# Patient Record
Sex: FEMALE | Race: WHITE | Employment: FULL TIME | URBAN - METROPOLITAN AREA
[De-identification: names, ages, dates, MRNs, and addresses within clinical notes are randomized per-mention and may not be internally consistent; named-entity substitution may affect disease eponyms.]

---

## 2018-01-02 ENCOUNTER — OFFICE VISIT (OUTPATIENT)
Dept: OTOLARYNGOLOGY | Facility: CLINIC | Age: 48
End: 2018-01-02

## 2018-01-02 VITALS
HEIGHT: 63.5 IN | DIASTOLIC BLOOD PRESSURE: 80 MMHG | SYSTOLIC BLOOD PRESSURE: 123 MMHG | WEIGHT: 115 LBS | TEMPERATURE: 99 F | BODY MASS INDEX: 20.12 KG/M2

## 2018-01-02 DIAGNOSIS — B37.0 ORAL THRUSH: Primary | ICD-10-CM

## 2018-01-02 PROCEDURE — 99212 OFFICE O/P EST SF 10 MIN: CPT | Performed by: OTOLARYNGOLOGY

## 2018-01-02 PROCEDURE — 99203 OFFICE O/P NEW LOW 30 MIN: CPT | Performed by: OTOLARYNGOLOGY

## 2018-01-02 RX ORDER — FLUCONAZOLE 200 MG/1
TABLET ORAL
Refills: 0 | COMMUNITY
Start: 2017-12-30 | End: 2019-06-11

## 2018-01-02 RX ORDER — BUDESONIDE AND FORMOTEROL FUMARATE DIHYDRATE 160; 4.5 UG/1; UG/1
AEROSOL RESPIRATORY (INHALATION)
Refills: 2 | COMMUNITY
Start: 2017-12-07 | End: 2019-06-11

## 2018-01-02 NOTE — PROGRESS NOTES
Jordyn Tripp is a 41year old female. Patient presents with:  Sore Throat: sore throat for 2 weeks,  Ear Wax: both ears      HISTORY OF PRESENT ILLNESS  He presents with a recent history of sore throat which started about 2 weeks ago.   Diagnosed with Normal, Left: Normal. Pupil - Right: Normal, Left: Normal. Fundus - Right: Normal, Left: Normal.   Neurological Normal Memory - Normal. Cranial nerves - Cranial nerves II through XII grossly intact.    Head/Face Normal Facial features - Normal. Eyebrows - N

## 2018-04-26 ENCOUNTER — OFFICE VISIT (OUTPATIENT)
Dept: OTOLARYNGOLOGY | Facility: CLINIC | Age: 48
End: 2018-04-26

## 2018-04-26 VITALS
HEIGHT: 63.5 IN | TEMPERATURE: 98 F | SYSTOLIC BLOOD PRESSURE: 122 MMHG | DIASTOLIC BLOOD PRESSURE: 80 MMHG | BODY MASS INDEX: 19.6 KG/M2 | WEIGHT: 112 LBS

## 2018-04-26 DIAGNOSIS — B37.0 ORAL THRUSH: Primary | ICD-10-CM

## 2018-04-26 PROCEDURE — 99214 OFFICE O/P EST MOD 30 MIN: CPT | Performed by: OTOLARYNGOLOGY

## 2018-04-26 PROCEDURE — 99212 OFFICE O/P EST SF 10 MIN: CPT | Performed by: OTOLARYNGOLOGY

## 2018-04-26 RX ORDER — FLUCONAZOLE 100 MG/1
100 TABLET ORAL DAILY
Qty: 4 TABLET | Refills: 0 | Status: SHIPPED | OUTPATIENT
Start: 2018-04-26 | End: 2018-04-30

## 2018-04-26 RX ORDER — FLUCONAZOLE 100 MG/1
TABLET ORAL
COMMUNITY
Start: 2018-04-20 | End: 2018-04-26

## 2018-04-26 RX ORDER — AZELASTINE HCL 205.5 UG/1
SPRAY NASAL
COMMUNITY
Start: 2014-12-23 | End: 2019-06-11

## 2018-04-26 RX ORDER — AZELASTINE HYDROCHLORIDE 0.5 MG/ML
SOLUTION/ DROPS OPHTHALMIC
COMMUNITY
Start: 2015-03-31

## 2018-04-26 RX ORDER — BUDESONIDE AND FORMOTEROL FUMARATE DIHYDRATE 160; 4.5 UG/1; UG/1
AEROSOL RESPIRATORY (INHALATION)
COMMUNITY
Start: 2013-07-30

## 2018-04-26 RX ORDER — AZELASTINE HCL 205.5 UG/1
SPRAY NASAL
COMMUNITY
Start: 2018-04-12 | End: 2019-06-11

## 2018-04-26 RX ORDER — ALBUTEROL SULFATE 90 UG/1
AEROSOL, METERED RESPIRATORY (INHALATION)
COMMUNITY
Start: 2013-07-30

## 2018-04-26 RX ORDER — CEVIMELINE HYDROCHLORIDE 30 MG/1
30 CAPSULE ORAL 3 TIMES DAILY
Qty: 90 CAPSULE | Refills: 3 | Status: SHIPPED | OUTPATIENT
Start: 2018-04-26

## 2018-04-26 RX ORDER — INHALER, ASSIST DEVICES
SPACER (EA) MISCELLANEOUS
Refills: 0 | COMMUNITY
Start: 2018-04-06

## 2018-04-26 RX ORDER — AZELASTINE HYDROCHLORIDE 0.5 MG/ML
SOLUTION/ DROPS OPHTHALMIC
COMMUNITY
Start: 2018-04-10 | End: 2019-06-11

## 2018-04-26 NOTE — PROGRESS NOTES
La Parisi is a 41year old female. Patient presents with:   Tonsil Problem: white spots at her tonsil  Throat Problem: sore throat for a week      HISTORY OF PRESENT ILLNESS  She presents with a recent history of sore throat which started about 2 w Constitutional Normal Overall appearance - Normal.   Psychiatric Normal Orientation - Oriented to time, place, person & situation. Appropriate mood and affect.    Neck Exam Normal Inspection - Normal. Palpation - Normal. Parotid gland - Normal. Thyroid gl Rfl:   •  Azelastine HCl 0.15 % Nasal Solution, , Disp: , Rfl:   •  Azelastine HCl 0.05 % Ophthalmic Solution, , Disp: , Rfl:   •  Spacer/Aero-Holding Chambers (VALVED HOLDING CHAMBER) Does not apply Device, U PRN, Disp: , Rfl: 0  •  nystatin [id number] UNIT/M

## 2019-02-18 ENCOUNTER — OFFICE VISIT (OUTPATIENT)
Dept: DERMATOLOGY CLINIC | Facility: CLINIC | Age: 49
End: 2019-02-18
Payer: COMMERCIAL

## 2019-02-18 DIAGNOSIS — D23.30 BENIGN NEOPLASM OF SKIN OF FACE: ICD-10-CM

## 2019-02-18 DIAGNOSIS — D23.60 BENIGN NEOPLASM OF SKIN OF UPPER LIMB, INCLUDING SHOULDER, UNSPECIFIED LATERALITY: ICD-10-CM

## 2019-02-18 DIAGNOSIS — D23.5 BENIGN NEOPLASM OF SKIN OF TRUNK, EXCEPT SCROTUM: ICD-10-CM

## 2019-02-18 DIAGNOSIS — L81.4 LENTIGO: Primary | ICD-10-CM

## 2019-02-18 DIAGNOSIS — D23.4 BENIGN NEOPLASM OF SCALP AND SKIN OF NECK: ICD-10-CM

## 2019-02-18 PROCEDURE — 99212 OFFICE O/P EST SF 10 MIN: CPT | Performed by: DERMATOLOGY

## 2019-02-18 PROCEDURE — 99203 OFFICE O/P NEW LOW 30 MIN: CPT | Performed by: DERMATOLOGY

## 2019-02-18 RX ORDER — FLUCONAZOLE 100 MG/1
100 TABLET ORAL
COMMUNITY
Start: 2018-04-20 | End: 2019-06-11

## 2019-02-18 RX ORDER — DICLOFENAC SODIUM 75 MG/1
75 TABLET, DELAYED RELEASE ORAL
COMMUNITY
Start: 2016-02-08

## 2019-02-18 RX ORDER — OMALIZUMAB 202.5 MG/1.4ML
INJECTION, SOLUTION SUBCUTANEOUS
COMMUNITY
Start: 2019-02-12

## 2019-02-18 RX ORDER — PREDNISONE 10 MG/1
TABLET ORAL
COMMUNITY
Start: 2018-04-02 | End: 2019-06-11 | Stop reason: ALTCHOICE

## 2019-02-18 RX ORDER — MONTELUKAST SODIUM 10 MG/1
TABLET ORAL
COMMUNITY
Start: 2018-10-20

## 2019-02-18 RX ORDER — AZELASTINE HCL 205.5 UG/1
2 SPRAY NASAL
COMMUNITY
Start: 2014-12-23

## 2019-02-18 RX ORDER — AZELASTINE 1 MG/ML
1 SPRAY, METERED NASAL
COMMUNITY
End: 2021-11-04 | Stop reason: ALTCHOICE

## 2019-02-18 RX ORDER — OXYMETAZOLINE HCL 0.05 G/100ML
1 SPRAY NASAL
COMMUNITY

## 2019-02-18 RX ORDER — LEVOCETIRIZINE DIHYDROCHLORIDE 5 MG/1
5 TABLET, FILM COATED ORAL
COMMUNITY

## 2019-02-18 RX ORDER — PIMECROLIMUS 10 MG/G
1 CREAM TOPICAL 2 TIMES DAILY
Qty: 60 G | Refills: 6 | Status: SHIPPED | OUTPATIENT
Start: 2019-02-18

## 2019-02-18 RX ORDER — BUDESONIDE AND FORMOTEROL FUMARATE DIHYDRATE 160; 4.5 UG/1; UG/1
2 AEROSOL RESPIRATORY (INHALATION)
COMMUNITY
End: 2019-06-11

## 2019-02-18 RX ORDER — FLUCONAZOLE 100 MG/1
100 TABLET ORAL DAILY
Qty: 10 TABLET | Refills: 3 | Status: SHIPPED | OUTPATIENT
Start: 2019-02-18

## 2019-02-18 RX ORDER — BENZONATATE 100 MG/1
100 CAPSULE ORAL
COMMUNITY
Start: 2018-04-06

## 2019-02-18 RX ORDER — ALBUTEROL SULFATE 90 UG/1
1-2 AEROSOL, METERED RESPIRATORY (INHALATION)
COMMUNITY

## 2019-03-03 NOTE — PROGRESS NOTES
Dario Echevarria is a 57year old female. HPI:     CC:  Patient presents with:  Derm Problem: new pt. pt presenting today with concerns with hair loss. grandmother has HX of SCC. no personal HX. Allergies:  Latex    HISTORY:    History reviewed.  Corby Mata None Entered Disp:  Rfl:    Azelastine HCl 0.15 % Nasal Solution None Entered Disp:  Rfl:    Beclomethasone Dipropionate (QNASL) 80 MCG/ACT Nasal Aero Soln 2 puffs daily Disp:  Rfl:    Tiotropium Bromide Monohydrate (SPIRIVA HANDIHALER) 18 MCG Inhalation C Sexual activity: Not on file    Lifestyle      Physical activity:        Days per week: Not on file        Minutes per session: Not on file      Stress: Not on file    Relationships      Social connections:        Talks on phone: Not on file        Gets to History, medications, allergies reviewed as noted. Physical Examination:     Well-developed well-nourished patient alert oriented in no acute distress.   Exam total-body performed, including scalp, head, neck, face,nails, hair, external eyes, includin age-related. Patchy or areas may be more alopecia areata. Would not recommend further injections patient already on numerous steroid inhalers, medications for her allergies. Risk of further atrophy of topical steroids discussed. Elidel.   In place of sasha contact me regarding any confusion resulting from errors in recognition.

## 2019-06-11 ENCOUNTER — OFFICE VISIT (OUTPATIENT)
Dept: OTOLARYNGOLOGY | Facility: CLINIC | Age: 49
End: 2019-06-11
Payer: COMMERCIAL

## 2019-06-11 VITALS
TEMPERATURE: 98 F | HEIGHT: 63.5 IN | SYSTOLIC BLOOD PRESSURE: 119 MMHG | BODY MASS INDEX: 20.12 KG/M2 | WEIGHT: 115 LBS | DIASTOLIC BLOOD PRESSURE: 80 MMHG

## 2019-06-11 DIAGNOSIS — H69.83 DYSFUNCTION OF BOTH EUSTACHIAN TUBES: Primary | ICD-10-CM

## 2019-06-11 PROCEDURE — 99212 OFFICE O/P EST SF 10 MIN: CPT | Performed by: OTOLARYNGOLOGY

## 2019-06-11 PROCEDURE — 99214 OFFICE O/P EST MOD 30 MIN: CPT | Performed by: OTOLARYNGOLOGY

## 2019-06-11 RX ORDER — ZAFIRLUKAST 20 MG/1
TABLET, FILM COATED ORAL
COMMUNITY
Start: 2019-06-07 | End: 2019-06-11

## 2019-06-11 RX ORDER — AZELASTINE 1 MG/ML
2 SPRAY, METERED NASAL 2 TIMES DAILY
Qty: 1 BOTTLE | Refills: 3 | Status: SHIPPED | OUTPATIENT
Start: 2019-06-11

## 2019-06-11 RX ORDER — PSEUDOEPHEDRINE HCL 120 MG/1
120 TABLET, FILM COATED, EXTENDED RELEASE ORAL EVERY 12 HOURS
Qty: 60 TABLET | Refills: 3 | Status: SHIPPED | OUTPATIENT
Start: 2019-06-11

## 2019-06-11 NOTE — PROGRESS NOTES
Woodfin Cooks is a 57year old female.   Patient presents with:  Ear Problem: clogged bilateral ears, right ear is worse for 10 days       HISTORY OF PRESENT ILLNESS  She presents with a recent history of sore throat which started about 2 weeks ago. Vladimir Fuller Details   Constitutional Negative Fatigue, fever and weight loss. ENMT Negative Drooling. Eyes Negative Blurred vision and vision changes. Respiratory Negative Dyspnea and wheezing.    Cardio Negative Chest pain, irregular heartbeat/palpitations and s Medications:   •  Albuterol Sulfate  (90 Base) MCG/ACT Inhalation Aero Soln, Inhale 1-2 puffs into the lungs. , Disp: , Rfl:   •  Levocetirizine Dihydrochloride 5 MG Oral Tab, Take 5 mg by mouth., Disp: , Rfl:   •  Montelukast Sodium 10 MG Oral Tab, has a congestive process going on. Continue Singulair and levocetirizine but I will call in Sudafed and Astelin nasal spray that she is used consistently. Return to see me in 3 to 4 weeks if no better. Miguelina Kerns.  Funmi Han MD    6/11/2019    8:54 A

## 2020-10-02 ENCOUNTER — OFFICE VISIT (OUTPATIENT)
Dept: RHEUMATOLOGY | Facility: CLINIC | Age: 50
End: 2020-10-02
Payer: COMMERCIAL

## 2020-10-02 ENCOUNTER — LAB ENCOUNTER (OUTPATIENT)
Dept: LAB | Facility: HOSPITAL | Age: 50
End: 2020-10-02
Attending: INTERNAL MEDICINE
Payer: COMMERCIAL

## 2020-10-02 VITALS
DIASTOLIC BLOOD PRESSURE: 67 MMHG | WEIGHT: 122 LBS | HEART RATE: 65 BPM | BODY MASS INDEX: 21.35 KG/M2 | SYSTOLIC BLOOD PRESSURE: 111 MMHG | HEIGHT: 63.5 IN

## 2020-10-02 DIAGNOSIS — M77.8 RIGHT SHOULDER TENDONITIS: Primary | ICD-10-CM

## 2020-10-02 DIAGNOSIS — M77.8 RIGHT SHOULDER TENDONITIS: ICD-10-CM

## 2020-10-02 DIAGNOSIS — M25.50 POLYARTHRALGIA: ICD-10-CM

## 2020-10-02 PROCEDURE — 85652 RBC SED RATE AUTOMATED: CPT | Performed by: INTERNAL MEDICINE

## 2020-10-02 PROCEDURE — 86431 RHEUMATOID FACTOR QUANT: CPT | Performed by: INTERNAL MEDICINE

## 2020-10-02 PROCEDURE — 86200 CCP ANTIBODY: CPT | Performed by: INTERNAL MEDICINE

## 2020-10-02 PROCEDURE — 3008F BODY MASS INDEX DOCD: CPT | Performed by: INTERNAL MEDICINE

## 2020-10-02 PROCEDURE — 99244 OFF/OP CNSLTJ NEW/EST MOD 40: CPT | Performed by: INTERNAL MEDICINE

## 2020-10-02 PROCEDURE — 36415 COLL VENOUS BLD VENIPUNCTURE: CPT | Performed by: INTERNAL MEDICINE

## 2020-10-02 PROCEDURE — 86140 C-REACTIVE PROTEIN: CPT | Performed by: INTERNAL MEDICINE

## 2020-10-02 PROCEDURE — 3078F DIAST BP <80 MM HG: CPT | Performed by: INTERNAL MEDICINE

## 2020-10-02 PROCEDURE — 3074F SYST BP LT 130 MM HG: CPT | Performed by: INTERNAL MEDICINE

## 2020-10-02 PROCEDURE — 84443 ASSAY THYROID STIM HORMONE: CPT

## 2020-10-02 RX ORDER — METHYLPREDNISOLONE 4 MG/1
TABLET ORAL
Qty: 1 PACKAGE | Refills: 0 | Status: SHIPPED | OUTPATIENT
Start: 2020-10-02

## 2020-10-02 NOTE — PROGRESS NOTES
Megan Zarate is a 52year old female who presents for Patient presents with:  Consult  Joint Pain: Knees,hips, feet,shoulders, patient states she can her some joints cracking  Hand Pain: wrists  .    HPI:   CC: joint pain  Consult: referred by PCP  Solution None Entered     • Budesonide-Formoterol Fumarate (SYMBICORT) 160-4.5 MCG/ACT Inhalation Aerosol 2 puff a day     • Spacer/Aero-Holding Chambers (VALVED HOLDING CHAMBER) Does not apply Device U PRN  0   • Pseudoephedrine HCl  MG Oral Tablet numbness or tingling, no headache, no hx of seizures,   Psych: no hx of anxiety or depression  ENDO: no hx of thyroid disease, no hx of DM  Joint/Muscluskeltal: see HPI,   All other ROS are negative.      EXAM:   /67   Pulse 65   Ht 5' 3.5\" (1.613 m) allowing me to participate in this patients care.  Pt will f/u if she needs cortisone injection     Tierra Dobson MD  10/2/2020  11:13 AM

## 2020-10-02 NOTE — PATIENT INSTRUCTIONS
You were seen today for joint pain  Mostly in your right shoulder and your feet  Lets get blood work for any type of inflammatory arthritis  We will give you a Medrol Dosepak for your shoulder  Start physical therapy  If your right shoulder does not improv

## 2020-11-06 ENCOUNTER — OFFICE VISIT (OUTPATIENT)
Dept: INTERNAL MEDICINE CLINIC | Facility: CLINIC | Age: 50
End: 2020-11-06
Payer: COMMERCIAL

## 2020-11-06 VITALS
HEIGHT: 63.5 IN | SYSTOLIC BLOOD PRESSURE: 110 MMHG | DIASTOLIC BLOOD PRESSURE: 84 MMHG | WEIGHT: 126.19 LBS | BODY MASS INDEX: 22.08 KG/M2

## 2020-11-06 DIAGNOSIS — Z00.00 WELLNESS EXAMINATION: Primary | ICD-10-CM

## 2020-11-06 DIAGNOSIS — Z13.820 SCREENING FOR OSTEOPOROSIS: ICD-10-CM

## 2020-11-06 DIAGNOSIS — Z98.82 HISTORY OF BILATERAL BREAST IMPLANTS: ICD-10-CM

## 2020-11-06 DIAGNOSIS — R63.1 POLYDIPSIA: ICD-10-CM

## 2020-11-06 PROCEDURE — 3008F BODY MASS INDEX DOCD: CPT | Performed by: INTERNAL MEDICINE

## 2020-11-06 PROCEDURE — 3079F DIAST BP 80-89 MM HG: CPT | Performed by: INTERNAL MEDICINE

## 2020-11-06 PROCEDURE — 3074F SYST BP LT 130 MM HG: CPT | Performed by: INTERNAL MEDICINE

## 2020-11-06 PROCEDURE — 99072 ADDL SUPL MATRL&STAF TM PHE: CPT | Performed by: INTERNAL MEDICINE

## 2020-11-06 PROCEDURE — 99396 PREV VISIT EST AGE 40-64: CPT | Performed by: INTERNAL MEDICINE

## 2020-11-06 NOTE — PROGRESS NOTES
HPI:    Patient ID: Megan Zarate is a 52year old female. HPI Pt here for getting established as a new pt. Is going through menopause. Having joint pains as well as hot flashes. No menses in over a year. .  Has sleep disturbance.     Review of Sys (NASAL SPRAY) 0.05 % Nasal Solution 1 spray by Nasal route. • Azelastine HCl 0.1 % Nasal Solution 1 spray by Nasal route. • Pimecrolimus (ELIDEL) 1 % External Cream Apply 1 Application topically 2 (two) times daily.  Use bid (Patient not taking: Rep (CPT=77067)  XR DEXA BONE DENSITOMETRY (CPT=77080)       #6015

## 2021-04-24 ENCOUNTER — HOSPITAL ENCOUNTER (OUTPATIENT)
Dept: MAMMOGRAPHY | Facility: HOSPITAL | Age: 51
Discharge: HOME | End: 2021-04-24
Attending: INTERNAL MEDICINE
Payer: COMMERCIAL

## 2021-04-24 ENCOUNTER — LAB ENCOUNTER (OUTPATIENT)
Dept: LAB | Facility: HOSPITAL | Age: 51
End: 2021-04-24
Attending: INTERNAL MEDICINE
Payer: COMMERCIAL

## 2021-04-24 DIAGNOSIS — Z00.00 WELLNESS EXAMINATION: ICD-10-CM

## 2021-04-24 DIAGNOSIS — R63.1 POLYDIPSIA: ICD-10-CM

## 2021-04-24 DIAGNOSIS — Z98.82 HISTORY OF BILATERAL BREAST IMPLANTS: ICD-10-CM

## 2021-04-24 PROCEDURE — 80061 LIPID PANEL: CPT | Performed by: INTERNAL MEDICINE

## 2021-04-24 PROCEDURE — 80053 COMPREHEN METABOLIC PANEL: CPT | Performed by: INTERNAL MEDICINE

## 2021-04-24 PROCEDURE — 86769 SARS-COV-2 COVID-19 ANTIBODY: CPT

## 2021-04-24 PROCEDURE — 85025 COMPLETE CBC W/AUTO DIFF WBC: CPT | Performed by: INTERNAL MEDICINE

## 2021-04-24 PROCEDURE — 36415 COLL VENOUS BLD VENIPUNCTURE: CPT

## 2021-04-24 PROCEDURE — 83036 HEMOGLOBIN GLYCOSYLATED A1C: CPT

## 2021-08-06 ENCOUNTER — HOSPITAL ENCOUNTER (OUTPATIENT)
Dept: MAMMOGRAPHY | Facility: HOSPITAL | Age: 51
Discharge: HOME | End: 2021-08-06
Attending: INTERNAL MEDICINE
Payer: COMMERCIAL

## 2021-08-06 ENCOUNTER — HOSPITAL ENCOUNTER (OUTPATIENT)
Dept: BONE DENSITY | Facility: HOSPITAL | Age: 51
Discharge: HOME | End: 2021-08-06
Attending: INTERNAL MEDICINE
Payer: COMMERCIAL

## 2021-08-06 DIAGNOSIS — Z13.820 SCREENING FOR OSTEOPOROSIS: ICD-10-CM

## 2021-08-06 PROCEDURE — 77067 SCR MAMMO BI INCL CAD: CPT | Performed by: INTERNAL MEDICINE

## 2021-08-06 PROCEDURE — 77063 BREAST TOMOSYNTHESIS BI: CPT | Performed by: INTERNAL MEDICINE

## 2021-08-06 PROCEDURE — 77080 DXA BONE DENSITY AXIAL: CPT | Performed by: INTERNAL MEDICINE

## 2021-11-04 ENCOUNTER — OFFICE VISIT (OUTPATIENT)
Dept: DERMATOLOGY CLINIC | Facility: CLINIC | Age: 51
End: 2021-11-04
Payer: COMMERCIAL

## 2021-11-04 DIAGNOSIS — L42 PITYRIASIS ROSEA: ICD-10-CM

## 2021-11-04 DIAGNOSIS — L30.4 INTERTRIGO: Primary | ICD-10-CM

## 2021-11-04 DIAGNOSIS — L81.4 LENTIGO: ICD-10-CM

## 2021-11-04 DIAGNOSIS — D23.9 BENIGN NEOPLASM OF SKIN, UNSPECIFIED LOCATION: ICD-10-CM

## 2021-11-04 PROCEDURE — 99213 OFFICE O/P EST LOW 20 MIN: CPT | Performed by: DERMATOLOGY

## 2021-11-04 RX ORDER — CLOTRIMAZOLE AND BETAMETHASONE DIPROPIONATE 10; .64 MG/G; MG/G
CREAM TOPICAL
Qty: 45 G | Refills: 2 | Status: SHIPPED | OUTPATIENT
Start: 2021-11-04

## 2021-11-15 NOTE — PROGRESS NOTES
Sonny Stdodard is a 54year old female. HPI:     CC:  Patient presents with:  Full Skin Exam: Hx of SCC. LOV 2/18/19. Pt presents for full body exam. No personal hx of skin ca. Rash: Rash to dom axilla, under breasts, and groin x 1 month.  Patient w needed     • Azelastine HCl 0.05 % Ophthalmic Solution None Entered     • Tiotropium Bromide Monohydrate 18 MCG Inhalation Cap Inhale into the lungs.      • Budesonide-Formoterol Fumarate 160-4.5 MCG/ACT Inhalation Aerosol 2 puff a day     • Spacer/Aero-Hol tanning: Not Asked        Outdoor occupation: Not Asked        Breast feeding: Not Asked        Reaction to local anesthetic: No    Social History Narrative      Not on file    Social Determinants of Health  Financial Resource Strain: Not on file  Food Ins medications, allergies reviewed as noted. Physical Examination:     Well-developed well-nourished patient alert oriented in no acute distress.   Exam performed, including scalp, head, neck, face,nails, hair, external eyes, including conjunctival mucos susupicious lesions on todays  exam.      Please refer to map for specific lesions. See additional diagnoses. Pros cons of various therapies, risks benefits discussed. Pathophysiology discussed with patient. Therapeutic options reviewed.   See  Medication

## 2023-07-10 ENCOUNTER — TELEPHONE (OUTPATIENT)
Dept: INTERNAL MEDICINE CLINIC | Facility: CLINIC | Age: 53
End: 2023-07-10

## 2023-07-10 NOTE — TELEPHONE ENCOUNTER
Central scheduling called requesting order for mammogram as pt attempted to make appt today. Informed central scheduling I will send msg but may not be appropriate as pt has not been seen in our practice since 2020.     May need appt with Dr. Scarlett Domínguez first.

## 2023-07-25 ENCOUNTER — NURSE TRIAGE (OUTPATIENT)
Dept: INTERNAL MEDICINE CLINIC | Facility: CLINIC | Age: 53
End: 2023-07-25

## 2023-07-25 DIAGNOSIS — Z00.00 ROUTINE ADULT HEALTH MAINTENANCE: Primary | ICD-10-CM

## 2023-07-25 NOTE — TELEPHONE ENCOUNTER
Call to patient. Left message that mammogram order has been placed. Left phone number to call to schedule on message as well. Advise to call the office with any questions or concerns.

## 2023-07-25 NOTE — TELEPHONE ENCOUNTER
Patient scheduled appointment for Monday, September 18th for annual physical; patient last seen in 11/2020. Can the patient get an order for a mammogram prior to her office visit?     Please call patient to inform if mammogram order is or is not placed:    [phone]     KG

## 2023-09-18 ENCOUNTER — OFFICE VISIT (OUTPATIENT)
Dept: INTERNAL MEDICINE CLINIC | Facility: CLINIC | Age: 53
End: 2023-09-18
Payer: COMMERCIAL

## 2023-09-18 VITALS
DIASTOLIC BLOOD PRESSURE: 70 MMHG | SYSTOLIC BLOOD PRESSURE: 110 MMHG | HEART RATE: 77 BPM | WEIGHT: 115 LBS | OXYGEN SATURATION: 96 % | HEIGHT: 63.5 IN | BODY MASS INDEX: 20.12 KG/M2

## 2023-09-18 DIAGNOSIS — M81.8 OTHER OSTEOPOROSIS WITHOUT CURRENT PATHOLOGICAL FRACTURE: ICD-10-CM

## 2023-09-18 DIAGNOSIS — Z00.00 WELLNESS EXAMINATION: Primary | ICD-10-CM

## 2023-09-18 DIAGNOSIS — L30.9 ECZEMA, UNSPECIFIED TYPE: ICD-10-CM

## 2023-09-18 DIAGNOSIS — J45.30 MILD PERSISTENT ASTHMA WITHOUT COMPLICATION: ICD-10-CM

## 2023-09-18 PROCEDURE — 3074F SYST BP LT 130 MM HG: CPT | Performed by: INTERNAL MEDICINE

## 2023-09-18 PROCEDURE — 3078F DIAST BP <80 MM HG: CPT | Performed by: INTERNAL MEDICINE

## 2023-09-18 PROCEDURE — 99396 PREV VISIT EST AGE 40-64: CPT | Performed by: INTERNAL MEDICINE

## 2023-09-18 PROCEDURE — 3008F BODY MASS INDEX DOCD: CPT | Performed by: INTERNAL MEDICINE

## 2023-09-18 RX ORDER — LORAZEPAM 0.5 MG/1
0.5 TABLET ORAL EVERY 6 HOURS PRN
Qty: 30 TABLET | Refills: 1 | Status: SHIPPED | OUTPATIENT
Start: 2023-09-18

## 2023-09-23 ENCOUNTER — HOSPITAL ENCOUNTER (OUTPATIENT)
Dept: MAMMOGRAPHY | Facility: HOSPITAL | Age: 53
Discharge: HOME | End: 2023-09-23
Attending: INTERNAL MEDICINE
Payer: COMMERCIAL

## 2023-09-23 DIAGNOSIS — Z00.00 ROUTINE ADULT HEALTH MAINTENANCE: ICD-10-CM

## 2023-09-23 PROCEDURE — 77067 SCR MAMMO BI INCL CAD: CPT | Performed by: INTERNAL MEDICINE

## 2023-09-23 PROCEDURE — 77063 BREAST TOMOSYNTHESIS BI: CPT | Performed by: INTERNAL MEDICINE

## 2023-11-10 ENCOUNTER — TELEPHONE (OUTPATIENT)
Dept: INTERNAL MEDICINE CLINIC | Facility: CLINIC | Age: 53
End: 2023-11-10

## 2023-11-10 NOTE — TELEPHONE ENCOUNTER
Received message on RN triage line. Pt was requesting labs. Spoke with pt. She states that since she lives so far she will go to the [facility] to get blood done. Lab orders printed and placed in the mail to pt.

## 2024-01-20 ENCOUNTER — HOSPITAL ENCOUNTER (OUTPATIENT)
Dept: BONE DENSITY | Age: 54
Discharge: HOME | End: 2024-01-20
Attending: INTERNAL MEDICINE
Payer: COMMERCIAL

## 2024-01-20 DIAGNOSIS — M81.8 OTHER OSTEOPOROSIS WITHOUT CURRENT PATHOLOGICAL FRACTURE: ICD-10-CM

## 2024-01-20 PROCEDURE — 77080 DXA BONE DENSITY AXIAL: CPT | Performed by: INTERNAL MEDICINE

## 2024-02-15 ENCOUNTER — OFFICE VISIT (OUTPATIENT)
Dept: OTOLARYNGOLOGY | Facility: CLINIC | Age: 54
End: 2024-02-15

## 2024-02-15 VITALS — HEIGHT: 63.5 IN | BODY MASS INDEX: 20.12 KG/M2 | WEIGHT: 115 LBS

## 2024-02-15 DIAGNOSIS — R09.82 POSTNASAL DISCHARGE: Primary | ICD-10-CM

## 2024-02-15 DIAGNOSIS — R07.0 THROAT DISCOMFORT: ICD-10-CM

## 2024-02-15 PROCEDURE — 31575 DIAGNOSTIC LARYNGOSCOPY: CPT | Performed by: OTOLARYNGOLOGY

## 2024-02-15 PROCEDURE — 3008F BODY MASS INDEX DOCD: CPT | Performed by: OTOLARYNGOLOGY

## 2024-02-15 PROCEDURE — 99203 OFFICE O/P NEW LOW 30 MIN: CPT | Performed by: OTOLARYNGOLOGY

## 2024-02-15 RX ORDER — OMEPRAZOLE 40 MG/1
40 CAPSULE, DELAYED RELEASE ORAL DAILY
Qty: 30 CAPSULE | Refills: 2 | Status: SHIPPED | OUTPATIENT
Start: 2024-02-15

## 2024-02-15 RX ORDER — AZELASTINE 1 MG/ML
2 SPRAY, METERED NASAL 2 TIMES DAILY
Qty: 30 ML | Refills: 3 | Status: SHIPPED | OUTPATIENT
Start: 2024-02-15

## 2024-02-15 NOTE — PROGRESS NOTES
Evangelist Emery is a 53 year old female.    Chief Complaint   Patient presents with    Throat Problem     Difficulty swallowing x4 months    Sinus Problem     Lots of congestion       HISTORY OF PRESENT ILLNESS  She presents with a recent history of sore throat which started about 2 weeks ago.  Diagnosed with acute thrush and treated with Diflucan for 12 days.  She has taken 4 days worth of antifungals.  She feels that her symptoms have resolved.      4/26/18 she is doing quite well up until recently when she was treated with steroids once again.  Steroids led to recurrence of her thrush.  Some improvement after being on Diflucan as well as nystatin.  Incompletely resolved and still has some tongue lesions.     6/11/19 2 weeks ago she developed an upper respiratory tract infection went to immediate care about 10 days ago was told that she had an infection but that it did not require any antibiotics and was given some prednisone which she did not take.  Essentially she is just been using Singulair and levocetirizine but no other medications and feels that her infectious process has resolved but that she still continues to be very congested and has fullness in both ears.  She can now palpate years but does not hear any bubbling.     2/15/24 she presents with 2-month history of feeling for something the back of her throat and some difficulty swallowing.  Feels that there is always tonsil congestion.  She does state today that she has been using Afrin 1 puff each nare at least once a day consistently for about 15 years.  She does note a lot of postnasal discharge at times.  No voice changes some throat clearing.  No other significant signs, symptoms or complaints.  Known history of allergies currently on Xyzal Flonase and montelukast.  Admits that she does not use her medications consistently.  Has a hard time tolerating high-dose Sudafed.      Social History     Socioeconomic History    Marital status: Single    Tobacco Use    Smoking status: Never    Smokeless tobacco: Never   Vaping Use    Vaping Use: Never used   Substance and Sexual Activity    Alcohol use: Yes    Drug use: Never   Other Topics Concern    Reaction to local anesthetic No       Family History   Problem Relation Age of Onset    Diabetes Father     Cancer Paternal Grandfather         throat    Breast Cancer Other         2 mat grt aunts       Past Medical History:   Diagnosis Date    Asthma     Osteoporosis     Scoliosis        Past Surgical History:   Procedure Laterality Date    NEEDLE BIOPSY LEFT           REVIEW OF SYSTEMS    System Neg/Pos Details   Constitutional Negative Fatigue, fever and weight loss.   ENMT Negative Drooling.   Eyes Negative Blurred vision and vision changes.   Respiratory Negative Dyspnea and wheezing.   Cardio Negative Chest pain, irregular heartbeat/palpitations and syncope.   GI Negative Abdominal pain and diarrhea.   Endocrine Negative Cold intolerance and heat intolerance.   Neuro Negative Tremors.   Psych Negative Anxiety and depression.   Integumentary Negative Frequent skin infections, pigment change and rash.   Hema/Lymph Negative Easy bleeding and easy bruising.           PHYSICAL EXAM    Ht 5' 3.5\" (1.613 m)   Wt 115 lb (52.2 kg)   BMI 20.05 kg/m²        Constitutional Normal Overall appearance - Normal.   Psychiatric Normal Orientation - Oriented to time, place, person & situation. Appropriate mood and affect.   Neck Exam Normal Inspection - Normal. Palpation - Normal. Parotid gland - Normal. Thyroid gland - Normal.   Eyes Normal Conjunctiva - Right: Normal, Left: Normal. Pupil - Right: Normal, Left: Normal. Fundus - Right: Normal, Left: Normal.   Neurological Normal Memory - Normal. Cranial nerves - Cranial nerves II through XII grossly intact.   Head/Face Normal Facial features - Normal. Eyebrows - Normal. Skull - Normal.        Nasopharynx Normal External nose - Normal. Lips/teeth/gums - Normal. Tonsils -  Normal. Oropharynx - Normal.   Ears Normal Inspection - Right: Normal, Left: Normal. Canal - Right: Normal, Left: Normal. TM - Right: Normal, Left: Normal.   Skin Normal Inspection - Normal.        Lymph Detail Normal Submental. Submandibular. Anterior cervical. Posterior cervical. Supraclavicular.        Nose/Mouth/Throat Normal External nose - Normal. Lips/teeth/gums - Normal. Tonsils - Normal. Oropharynx - Normal.   Nose/Mouth/Throat Normal Nares - Right: Normal Left: Normal. Septum -deviated turbinates - Right: Normal, Left: Normal.  Mucosal changes most likely secondary to rhinitis medicamentosa.   Procedures:  Endoscopy/Laryngoscopy  Pre-Procedure Care: Verbal consent was obtained. Procedure/risks were explained. Questions were answered. Correct patient identified. Correct side and site confirmed.      A topical spray of ).25% Neosynephrine was sprayed into the nose.    Laryngoscopy:  Flexible Fiberoptic Laryngoscopy: A diagnostic flexible fiberoptic laryngoscopy was performed. The flexible fiberoptic laryngoscope was placed into the nose or mouthand advanced  into the interior of the larynx. A thorough examination of the interior of the larynx was performed.   Findings were as follows.       Hypopharynx/Larynx:  Epiglottis is normal.  Arytenoids:  Bilateral: Arytenoids are normal.  Vocal folds-false  Bilateral: Vocal folds (false) are normal.  Vocal folds-true  Bilateral: Vocal folds (true) are normal.  Pyriform sinus:  Bilateral: Pyriform sinuses are normal.  Base of tongue is normal in appearance.  There is no airway obstruction.   General comments: Erythema of the laryngeal mucosa no lesions masses polyps or other abnormalities.              Current Outpatient Medications:     Omeprazole 40 MG Oral Capsule Delayed Release, Take 1 capsule (40 mg total) by mouth daily. Before a meal, Disp: 30 capsule, Rfl: 2    azelastine 0.1 % Nasal Solution, 2 sprays by Nasal route 2 (two) times daily., Disp: 30 mL, Rfl:  3    alendronate 70 MG Oral Tab, Take 1 tablet (70 mg total) by mouth every 7 days., Disp: 4 tablet, Rfl: 11    LORazepam 0.5 MG Oral Tab, Take 1 tablet (0.5 mg total) by mouth every 6 (six) hours as needed for Anxiety., Disp: 30 tablet, Rfl: 1    clotrimazole-betamethasone 1-0.05 % External Cream, Use bid to affected areas of skin, Disp: 45 g, Rfl: 2    Risedronate Sodium 150 MG Oral Tab, Take 1 tablet (150 mg total) by mouth every 30 (thirty) days. (Patient not taking: Reported on 9/18/2023), Disp: 3 tablet, Rfl: 3    methylPREDNISolone 4 MG Oral Tablet Therapy Pack, Take as directed on package. (Patient not taking: Reported on 11/6/2020), Disp: 1 Package, Rfl: 0    Azelastine HCl 0.1 % Nasal Solution, 2 sprays by Nasal route 2 (two) times daily., Disp: 1 Bottle, Rfl: 3    Albuterol Sulfate  (90 Base) MCG/ACT Inhalation Aero Soln, Inhale 1-2 puffs into the lungs., Disp: , Rfl:     benzonatate 100 MG Oral Cap, Take 100 mg by mouth. (Patient not taking: Reported on 11/4/2021), Disp: , Rfl:     Diclofenac Sodium 75 MG Oral Tab EC, Take 75 mg by mouth. (Patient not taking: Reported on 9/18/2023), Disp: , Rfl:     Levocetirizine Dihydrochloride 5 MG Oral Tab, Take 1 tablet (5 mg total) by mouth., Disp: , Rfl:     Montelukast Sodium 10 MG Oral Tab, , Disp: , Rfl:     XOLAIR 150 MG Subcutaneous Recon Soln, , Disp: , Rfl:     Oxymetazoline HCl (NASAL SPRAY) 0.05 % Nasal Solution, 1 spray by Nasal route., Disp: , Rfl:     Pimecrolimus (ELIDEL) 1 % External Cream, Apply 1 Application topically 2 (two) times daily. Use bid (Patient not taking: Reported on 10/2/2020), Disp: 60 g, Rfl: 6    fluconazole 100 MG Oral Tab, Take 1 tablet (100 mg total) by mouth daily. 1 po qd (Patient not taking: Reported on 11/6/2020), Disp: 10 tablet, Rfl: 3    Azelastine HCl 0.05 % Ophthalmic Solution, None Entered, Disp: , Rfl:     Tiotropium Bromide Monohydrate 18 MCG Inhalation Cap, Inhale into the lungs., Disp: , Rfl:      Budesonide-Formoterol Fumarate 160-4.5 MCG/ACT Inhalation Aerosol, 2 puff a day, Disp: , Rfl:     Spacer/Aero-Holding Chambers (VALVED HOLDING CHAMBER) Does not apply Device, U PRN, Disp: , Rfl: 0    Cevimeline HCl 30 MG Oral Cap, Take 1 capsule (30 mg total) by mouth 3 (three) times daily. (Patient not taking: Reported on 10/2/2020), Disp: 90 capsule, Rfl: 3  ASSESSMENT AND PLAN    1. Postnasal discharge    2. Throat discomfort  Uses Afrin almost on a daily basis at most 1 puff a day for the past 15 years.  I did talk to her about the importance of stopping the use of Afrin altogether.  I will start her on low-dose Sudafed 30 mg p.o. 4 times a day and have her stop Flonase and start Astelin nasal spray.  I will put her on omeprazole as well for any reflux related issues.  I did asked her to hold off on the use of her Symbicort is much as possible to limit irritation to the larynx from her inhaler.  Continue Xyzal and montelukast.  Return to see me in 1 month for reevaluation.  I did reassure her that her laryngoscopy revealed no lesions masses or any other abnormalities of the larynx  - LARYNGOSCOPY,FLEX FIBER,DIAGNOSTIC        This note was prepared using Dragon Medical voice recognition dictation software. As a result errors may occur. When identified these errors have been corrected. While every attempt is made to correct errors during dictation discrepancies may still exist    Kavin Cloud MD    2/15/2024    8:16 AM

## 2024-02-26 ENCOUNTER — TELEPHONE (OUTPATIENT)
Dept: INTERNAL MEDICINE CLINIC | Facility: CLINIC | Age: 54
End: 2024-02-26

## 2024-02-26 NOTE — TELEPHONE ENCOUNTER
Patient would like to speak with Dr Cruz or Ma to discuss her dexa scan results. She also would  like to go over some paperwork for her job regarding some restrictions, it is not [facility] paperwork.

## 2024-02-27 NOTE — TELEPHONE ENCOUNTER
Pt is calliing back and would like to speak with Dr Cruz or her MA regarding test results for a dexa scan and paperwork for her employer..

## 2024-03-06 ENCOUNTER — TELEPHONE (OUTPATIENT)
Dept: INTERNAL MEDICINE CLINIC | Facility: CLINIC | Age: 54
End: 2024-03-06

## 2024-03-06 NOTE — TELEPHONE ENCOUNTER
Patient called the office to let Dr. Cruz know she faxed the document that they talked about that she needs to complete.    She wrote on the top of the sheet the information of commodities they discussed to remind her.

## 2024-03-07 NOTE — TELEPHONE ENCOUNTER
Patient is calling in again asking if fax has been received and states that she would like to speak to .

## 2024-03-15 ENCOUNTER — OFFICE VISIT (OUTPATIENT)
Dept: OTOLARYNGOLOGY | Facility: CLINIC | Age: 54
End: 2024-03-15

## 2024-03-15 VITALS — WEIGHT: 120 LBS | BODY MASS INDEX: 21.26 KG/M2 | HEIGHT: 63 IN

## 2024-03-15 DIAGNOSIS — R07.0 THROAT DISCOMFORT: ICD-10-CM

## 2024-03-15 DIAGNOSIS — R09.82 POSTNASAL DISCHARGE: Primary | ICD-10-CM

## 2024-03-15 PROCEDURE — 99214 OFFICE O/P EST MOD 30 MIN: CPT | Performed by: OTOLARYNGOLOGY

## 2024-03-15 PROCEDURE — 3008F BODY MASS INDEX DOCD: CPT | Performed by: OTOLARYNGOLOGY

## 2024-03-15 RX ORDER — PREDNISONE 10 MG/1
TABLET ORAL
COMMUNITY
Start: 2023-09-19

## 2024-03-15 NOTE — PROGRESS NOTES
Evangelist Emery is a 53 year old female.    Chief Complaint   Patient presents with    Follow - Up     Pt here for post nasal discharge and throat discomfort  f/u.       HISTORY OF PRESENT ILLNESS  She presents with a recent history of sore throat which started about 2 weeks ago.  Diagnosed with acute thrush and treated with Diflucan for 12 days.  She has taken 4 days worth of antifungals.  She feels that her symptoms have resolved.      4/26/18 she is doing quite well up until recently when she was treated with steroids once again.  Steroids led to recurrence of her thrush.  Some improvement after being on Diflucan as well as nystatin.  Incompletely resolved and still has some tongue lesions.     6/11/19 2 weeks ago she developed an upper respiratory tract infection went to immediate care about 10 days ago was told that she had an infection but that it did not require any antibiotics and was given some prednisone which she did not take.  Essentially she is just been using Singulair and levocetirizine but no other medications and feels that her infectious process has resolved but that she still continues to be very congested and has fullness in both ears.  She can now palpate years but does not hear any bubbling.     2/15/24 she presents with 2-month history of feeling for something the back of her throat and some difficulty swallowing.  Feels that there is always tonsil congestion.  She does state today that she has been using Afrin 1 puff each nare at least once a day consistently for about 15 years.  She does note a lot of postnasal discharge at times.  No voice changes some throat clearing.  No other significant signs, symptoms or complaints.  Known history of allergies currently on Xyzal Flonase and montelukast.  Admits that she does not use her medications consistently.  Has a hard time tolerating high-dose Sudafed.     3/15/24 since I last saw her she has done quite well.  No longer using Afrin at all.   Last visit I did ask her to hold off and use of her Symbicort is much as possible and to continue with Xyzal and montelukast.  She is on low-dose Sudafed longer using any nasal sprays resolve her reflux.  Overall feels that her throat has improved dramatically and even her nose is better suspected congestion.  Occasional have some congestive issues in the middle of the day but that is tolerable and she can get by.  No other signs, symptoms or complaints.  Currently working at home has been asked to potentially return to work in a workplace but she gets sick very frequently.  She does have a long history of asthma.      Social History     Socioeconomic History    Marital status: Single   Tobacco Use    Smoking status: Never    Smokeless tobacco: Never   Vaping Use    Vaping Use: Never used   Substance and Sexual Activity    Alcohol use: Yes    Drug use: Never   Other Topics Concern    Reaction to local anesthetic No       Family History   Problem Relation Age of Onset    Diabetes Father     Cancer Paternal Grandfather         throat    Breast Cancer Other         2 mat grt aunts       Past Medical History:   Diagnosis Date    Asthma (HCC)     Osteoporosis     Scoliosis        Past Surgical History:   Procedure Laterality Date    NEEDLE BIOPSY LEFT           REVIEW OF SYSTEMS    System Neg/Pos Details   Constitutional Negative Fatigue, fever and weight loss.   ENMT Negative Drooling.   Eyes Negative Blurred vision and vision changes.   Respiratory Negative Dyspnea and wheezing.   Cardio Negative Chest pain, irregular heartbeat/palpitations and syncope.   GI Negative Abdominal pain and diarrhea.   Endocrine Negative Cold intolerance and heat intolerance.   Neuro Negative Tremors.   Psych Negative Anxiety and depression.   Integumentary Negative Frequent skin infections, pigment change and rash.   Hema/Lymph Negative Easy bleeding and easy bruising.           PHYSICAL EXAM    Ht 5' 3\" (1.6 m)   Wt 120 lb (54.4 kg)    BMI 21.26 kg/m²        Constitutional Normal Overall appearance - Normal.   Psychiatric Normal Orientation - Oriented to time, place, person & situation. Appropriate mood and affect.   Neck Exam Normal Inspection - Normal. Palpation - Normal. Parotid gland - Normal. Thyroid gland - Normal.   Eyes Normal Conjunctiva - Right: Normal, Left: Normal. Pupil - Right: Normal, Left: Normal. Fundus - Right: Normal, Left: Normal.   Neurological Normal Memory - Normal. Cranial nerves - Cranial nerves II through XII grossly intact.   Head/Face Normal Facial features - Normal. Eyebrows - Normal. Skull - Normal.        Nasopharynx Normal External nose - Normal. Lips/teeth/gums - Normal. Tonsils - Normal. Oropharynx - Normal.   Ears Normal Inspection - Right: Normal, Left: Normal. Canal - Right: Normal, Left: Normal. TM - Right: Normal, Left: Normal.   Skin Normal Inspection - Normal.        Lymph Detail Normal Submental. Submandibular. Anterior cervical. Posterior cervical. Supraclavicular.        Nose/Mouth/Throat Normal External nose - Normal. Lips/teeth/gums - Normal. Tonsils - Normal. Oropharynx - Normal.   Nose/Mouth/Throat Normal Nares - Right: Normal Left: Normal. Septum -deviation to the left turbinates - Right: Normal, Left: Normal.       Current Outpatient Medications:     predniSONE 10 MG Oral Tab, , Disp: , Rfl:     azelastine 0.1 % Nasal Solution, 2 sprays by Nasal route 2 (two) times daily., Disp: 30 mL, Rfl: 3    alendronate 70 MG Oral Tab, Take 1 tablet (70 mg total) by mouth every 7 days., Disp: 4 tablet, Rfl: 11    LORazepam 0.5 MG Oral Tab, Take 1 tablet (0.5 mg total) by mouth every 6 (six) hours as needed for Anxiety., Disp: 30 tablet, Rfl: 1    clotrimazole-betamethasone 1-0.05 % External Cream, Use bid to affected areas of skin, Disp: 45 g, Rfl: 2    Azelastine HCl 0.1 % Nasal Solution, 2 sprays by Nasal route 2 (two) times daily., Disp: 1 Bottle, Rfl: 3    Albuterol Sulfate  (90 Base) MCG/ACT  Inhalation Aero Soln, Inhale 1-2 puffs into the lungs., Disp: , Rfl:     Levocetirizine Dihydrochloride 5 MG Oral Tab, Take 1 tablet (5 mg total) by mouth., Disp: , Rfl:     Montelukast Sodium 10 MG Oral Tab, , Disp: , Rfl:     XOLAIR 150 MG Subcutaneous Recon Soln, , Disp: , Rfl:     Oxymetazoline HCl (NASAL SPRAY) 0.05 % Nasal Solution, 1 spray by Nasal route., Disp: , Rfl:     Azelastine HCl 0.05 % Ophthalmic Solution, None Entered, Disp: , Rfl:     Tiotropium Bromide Monohydrate 18 MCG Inhalation Cap, Inhale into the lungs., Disp: , Rfl:     Budesonide-Formoterol Fumarate 160-4.5 MCG/ACT Inhalation Aerosol, 2 puff a day, Disp: , Rfl:     Spacer/Aero-Holding Chambers (VALVED HOLDING CHAMBER) Does not apply Device, U PRN, Disp: , Rfl: 0    Omeprazole 40 MG Oral Capsule Delayed Release, Take 1 capsule (40 mg total) by mouth daily. Before a meal (Patient not taking: Reported on 3/15/2024), Disp: 30 capsule, Rfl: 2    Risedronate Sodium 150 MG Oral Tab, Take 1 tablet (150 mg total) by mouth every 30 (thirty) days. (Patient not taking: Reported on 9/18/2023), Disp: 3 tablet, Rfl: 3    methylPREDNISolone 4 MG Oral Tablet Therapy Pack, Take as directed on package. (Patient not taking: Reported on 11/6/2020), Disp: 1 Package, Rfl: 0    benzonatate 100 MG Oral Cap, Take 100 mg by mouth. (Patient not taking: Reported on 11/4/2021), Disp: , Rfl:     Diclofenac Sodium 75 MG Oral Tab EC, Take 75 mg by mouth. (Patient not taking: Reported on 9/18/2023), Disp: , Rfl:     Pimecrolimus (ELIDEL) 1 % External Cream, Apply 1 Application topically 2 (two) times daily. Use bid (Patient not taking: Reported on 10/2/2020), Disp: 60 g, Rfl: 6    fluconazole 100 MG Oral Tab, Take 1 tablet (100 mg total) by mouth daily. 1 po qd (Patient not taking: Reported on 11/6/2020), Disp: 10 tablet, Rfl: 3    Cevimeline HCl 30 MG Oral Cap, Take 1 capsule (30 mg total) by mouth 3 (three) times daily. (Patient not taking: Reported on 10/2/2020), Disp:  90 capsule, Rfl: 3  ASSESSMENT AND PLAN    1. Postnasal discharge    2. Throat discomfort  Overall doing much better no longer using Afrin at all I have asked her to continue with all of her medications for now and to return to see me in a few months for reevaluation.  She will contact me if she needs a note excusing her from work as she does very poorly when she is in a situation where she is exposed to possible infection as it takes her 6 weeks or longer to clear infectious processes.  I have asked her to use Ponaris nasal emollient to see if this helps with her daytime congestive issues since she is no longer using Afrin.  30 minutes spent with patient discussion regarding management of her signs and symptoms and potential need for continued working at home to limit sinus infections in the future.        This note was prepared using Dragon [employer] voice recognition dictation software. As a result errors may occur. When identified these errors have been corrected. While every attempt is made to correct errors during dictation discrepancies may still exist    Kavin Cloud MD    3/15/2024    11:11 AM

## 2024-03-19 ENCOUNTER — TELEPHONE (OUTPATIENT)
Dept: INTERNAL MEDICINE CLINIC | Facility: CLINIC | Age: 54
End: 2024-03-19

## 2024-03-19 NOTE — TELEPHONE ENCOUNTER
Patient says the forms that were completed on 03/07/2024 where incomplete.    Please contact the patient to discuss what additional questions need to be answered in order to fully complete these forms:    [phone]     KG

## 2024-03-21 ENCOUNTER — OFFICE VISIT (OUTPATIENT)
Dept: INTERNAL MEDICINE CLINIC | Facility: CLINIC | Age: 54
End: 2024-03-21
Payer: COMMERCIAL

## 2024-03-21 VITALS
BODY MASS INDEX: 20.23 KG/M2 | OXYGEN SATURATION: 99 % | DIASTOLIC BLOOD PRESSURE: 80 MMHG | HEIGHT: 63 IN | WEIGHT: 114.19 LBS | SYSTOLIC BLOOD PRESSURE: 120 MMHG | HEART RATE: 67 BPM | TEMPERATURE: 99 F

## 2024-03-21 DIAGNOSIS — J45.30 MILD PERSISTENT ASTHMA WITHOUT COMPLICATION (HCC): Primary | ICD-10-CM

## 2024-03-21 DIAGNOSIS — J30.89 ENVIRONMENTAL AND SEASONAL ALLERGIES: ICD-10-CM

## 2024-03-21 DIAGNOSIS — R45.89 ANXIETY ABOUT HEALTH: ICD-10-CM

## 2024-03-21 DIAGNOSIS — L30.9 ECZEMA, UNSPECIFIED TYPE: ICD-10-CM

## 2024-03-21 PROCEDURE — 3074F SYST BP LT 130 MM HG: CPT

## 2024-03-21 PROCEDURE — 3079F DIAST BP 80-89 MM HG: CPT

## 2024-03-21 PROCEDURE — 99214 OFFICE O/P EST MOD 30 MIN: CPT

## 2024-03-21 PROCEDURE — 3008F BODY MASS INDEX DOCD: CPT

## 2024-03-21 RX ORDER — ESCITALOPRAM OXALATE 10 MG/1
10 TABLET ORAL DAILY
Qty: 30 TABLET | Refills: 0 | Status: SHIPPED | OUTPATIENT
Start: 2024-03-21

## 2024-03-22 PROBLEM — J30.89 ENVIRONMENTAL AND SEASONAL ALLERGIES: Status: ACTIVE | Noted: 2024-03-22

## 2024-03-22 PROBLEM — L30.9 ECZEMA: Status: ACTIVE | Noted: 2024-03-22

## 2024-03-22 NOTE — PROGRESS NOTES
CHIEF COMPLAINT:     Chief Complaint   Patient presents with    Stress     Work stress related to health.        HPI:   Evangelist Emery is a 53 year old female who presents with complaints of asthma, allergies, gets colds easily.  Hx of asthma - uses Air purifier at home   Had been getting colds easily at work, that were lasting 8 weeks. Since working form home, she has less asthma flares and colds last only 2 weeks.   Asthma /allergy Medications: Xolair injections, Symbicort, Tiotropium, Montelukast, and Albuterol.  Has developed eczema recently.  Needs forms filled out for work accomodation. Was working from home to prevent colds and allergy/asthma flares, now needs form to request continuance of work.  now needs form to request continuance of work accommodations.  Also has musculoskeletal symptoms that she has ergonomic work equipment at home. Hx of Scoliosis and Osteoporosis    Current Outpatient Medications   Medication Sig Dispense Refill    escitalopram 10 MG Oral Tab Take 1 tablet (10 mg total) by mouth daily. Start with 1/2 tab for at least 1 week 30 tablet 0    predniSONE 10 MG Oral Tab       azelastine 0.1 % Nasal Solution 2 sprays by Nasal route 2 (two) times daily. 30 mL 3    alendronate 70 MG Oral Tab Take 1 tablet (70 mg total) by mouth every 7 days. 4 tablet 11    LORazepam 0.5 MG Oral Tab Take 1 tablet (0.5 mg total) by mouth every 6 (six) hours as needed for Anxiety. 30 tablet 1    clotrimazole-betamethasone 1-0.05 % External Cream Use bid to affected areas of skin 45 g 2    Albuterol Sulfate  (90 Base) MCG/ACT Inhalation Aero Soln Inhale 1-2 puffs into the lungs.      Montelukast Sodium 10 MG Oral Tab       XOLAIR 150 MG Subcutaneous Recon Soln       Oxymetazoline HCl (NASAL SPRAY) 0.05 % Nasal Solution 1 spray by Nasal route.      Azelastine HCl 0.05 % Ophthalmic Solution None Entered      Tiotropium Bromide Monohydrate 18 MCG Inhalation Cap Inhale into the lungs.       Budesonide-Formoterol Fumarate 160-4.5 MCG/ACT Inhalation Aerosol 2 puff a day      Spacer/Aero-Holding Chambers (VALVED HOLDING CHAMBER) Does not apply Device U PRN  0    Omeprazole 40 MG Oral Capsule Delayed Release Take 1 capsule (40 mg total) by mouth daily. Before a meal (Patient not taking: Reported on 3/15/2024) 30 capsule 2    Risedronate Sodium 150 MG Oral Tab Take 1 tablet (150 mg total) by mouth every 30 (thirty) days. (Patient not taking: Reported on 9/18/2023) 3 tablet 3    Levocetirizine Dihydrochloride 5 MG Oral Tab Take 1 tablet (5 mg total) by mouth.      Pimecrolimus (ELIDEL) 1 % External Cream Apply 1 Application topically 2 (two) times daily. Use bid (Patient not taking: Reported on 10/2/2020) 60 g 6    fluconazole 100 MG Oral Tab Take 1 tablet (100 mg total) by mouth daily. 1 po qd (Patient not taking: Reported on 11/6/2020) 10 tablet 3    Cevimeline HCl 30 MG Oral Cap Take 1 capsule (30 mg total) by mouth 3 (three) times daily. (Patient not taking: Reported on 10/2/2020) 90 capsule 3      Past Medical History:   Diagnosis Date    Asthma (HCC)     Osteoporosis     Scoliosis       Social History:  Social History     Socioeconomic History    Marital status: Single   Tobacco Use    Smoking status: Never    Smokeless tobacco: Never   Vaping Use    Vaping Use: Never used   Substance and Sexual Activity    Alcohol use: Yes    Drug use: Never   Other Topics Concern    Reaction to local anesthetic No        REVIEW OF SYSTEMS:   GENERAL: Denies fever, chills,weight change, decreased appetite  SKIN: Denies rashes, skin wounds or ulcers.  EYES: Denies blurred vision or double vision  HENT: Denies congestion, rhinorrhea, sore throat or ear pain today  CHEST: Denies chest pain, or palpitations  LUNGS: Denies shortness of breath, cough, or wheezing today  MUSCULOSKELETAL: no new arthralgia . Has shoulder swollen joints  LYMPH:  Denies lymphadenopathy  NEURO: Denies headaches or lightheadedness      EXAM:   BP  120/80   Pulse 67   Temp 98.6 °F (37 °C)   Ht 5' 3\" (1.6 m)   Wt 114 lb 3.2 oz (51.8 kg)   SpO2 99%   BMI 20.23 kg/m²   Vitals:    03/21/24 1727   BP: 120/80   Pulse: 67   Temp: 98.6 °F (37 °C)   SpO2: 99%   Weight: 114 lb 3.2 oz (51.8 kg)   Height: 5' 3\" (1.6 m)       GENERAL: well developed, well nourished,in no apparent distress  SKIN: no rashes,no suspicious lesions  HEAD: atraumatic, normocephalic  EYES: conjunctiva clear, EOM intact, PERRLA  EARS: TM's gray, no bulging, no retraction, no fluid, bony landmarks are visible   NOSE: nostrils patent, clear exudates, nasal mucosa pink and noninflamed  THROAT: oral mucosa pink, moist. No visible dental caries. Posterior pharynx is not erythematous. No tonsillar exudates.  NECK: supple, non-tender  LUNGS: clear to auscultation bilaterally, no wheezes or rhonchi. Breathing is non labored.  CARDIO: RRR without murmur  GI: No visible scars, or masses. BS's present x4. No palpable masses or hepatosplenomegaly.  no tenderness on palpation  :Tissue is normal color for ethnicity, moist, without rash or redness  EXTREMITIES: no cyanosis, clubbing or edema.   LYMPH:  no cerv. lymphadenopathy.      ASSESSMENT AND PLAN:     ASSESSMENT/PLAN:    1. Mild persistent asthma without complication (HCC)  Good control currently.  Recommend continued accommodations for working from home with controlled health environment to reduce triggers for asthma and allergies    2. Eczema, unspecified type  Follow up with Derm  - Vitamin D [E]; Future    3. Environmental and seasonal allergies  -continue Montelukast    4. Anxiety about health  Escitalopram, Return in 1 month for recheck and follow up on other health questions.          Medications    escitalopram 10 MG Oral Tab       Reminded on need to get wellness labs done that were ordered in Sept, 2023: CMP, CMC, Lipds, TSH and Vit D . Want to check these in relation to stress/anxiety as well  Counseling 20 minutes: anxiety and stress  about health and work  Chart review of asthma and allergy management  There are no Patient Instructions on file for this visit.    ISAAC Rodriguez      The patient indicates understanding of these issues and agrees to the plan.

## 2024-03-25 NOTE — TELEPHONE ENCOUNTER
Patient called, as she said Anna, NP was going to complete paperwork for her and she is checking on the status of the paperwork.

## 2024-03-27 ENCOUNTER — TELEPHONE (OUTPATIENT)
Dept: INTERNAL MEDICINE CLINIC | Facility: CLINIC | Age: 54
End: 2024-03-27

## 2024-03-27 NOTE — TELEPHONE ENCOUNTER
Patient came into the office and left [place] paperwork with provider for processing. A fee was not collected or THUAN. Forms scanned and emailed also will be sent via interoffice mail.

## 2024-03-28 NOTE — TELEPHONE ENCOUNTER
FMLA received in forms department 3/27 and logged for processing. No THUAN   Pt sent MyChart msg.

## 2024-04-11 ENCOUNTER — OFFICE VISIT (OUTPATIENT)
Dept: INTERNAL MEDICINE CLINIC | Facility: CLINIC | Age: 54
End: 2024-04-11
Payer: COMMERCIAL

## 2024-04-11 VITALS
HEART RATE: 71 BPM | HEIGHT: 63 IN | DIASTOLIC BLOOD PRESSURE: 60 MMHG | WEIGHT: 115 LBS | OXYGEN SATURATION: 97 % | BODY MASS INDEX: 20.37 KG/M2 | SYSTOLIC BLOOD PRESSURE: 110 MMHG

## 2024-04-11 DIAGNOSIS — G89.29 CHRONIC RIGHT SHOULDER PAIN: Primary | ICD-10-CM

## 2024-04-11 DIAGNOSIS — G89.29 CHRONIC LEFT SHOULDER PAIN: ICD-10-CM

## 2024-04-11 DIAGNOSIS — M26.641 ARTHRITIS OF RIGHT TEMPOROMANDIBULAR JOINT: ICD-10-CM

## 2024-04-11 DIAGNOSIS — M81.8 OSTEOPOROSIS, IDIOPATHIC: ICD-10-CM

## 2024-04-11 DIAGNOSIS — M25.512 CHRONIC LEFT SHOULDER PAIN: ICD-10-CM

## 2024-04-11 DIAGNOSIS — M25.511 CHRONIC RIGHT SHOULDER PAIN: Primary | ICD-10-CM

## 2024-04-11 DIAGNOSIS — J45.20 INTERMITTENT ASTHMA WITHOUT COMPLICATION, UNSPECIFIED ASTHMA SEVERITY (HCC): ICD-10-CM

## 2024-04-11 PROCEDURE — 3074F SYST BP LT 130 MM HG: CPT | Performed by: INTERNAL MEDICINE

## 2024-04-11 PROCEDURE — 3008F BODY MASS INDEX DOCD: CPT | Performed by: INTERNAL MEDICINE

## 2024-04-11 PROCEDURE — 99214 OFFICE O/P EST MOD 30 MIN: CPT | Performed by: INTERNAL MEDICINE

## 2024-04-11 PROCEDURE — 3078F DIAST BP <80 MM HG: CPT | Performed by: INTERNAL MEDICINE

## 2024-04-11 RX ORDER — MELOXICAM 15 MG/1
15 TABLET ORAL DAILY
Qty: 30 TABLET | Refills: 1 | Status: SHIPPED | OUTPATIENT
Start: 2024-04-11

## 2024-04-11 RX ORDER — VALACYCLOVIR HYDROCHLORIDE 1 G/1
2000 TABLET, FILM COATED ORAL 2 TIMES DAILY PRN
Qty: 21 TABLET | Refills: 5 | Status: SHIPPED | OUTPATIENT
Start: 2024-04-11

## 2024-04-11 RX ORDER — VALACYCLOVIR HYDROCHLORIDE 1 G/1
2000 TABLET, FILM COATED ORAL 2 TIMES DAILY PRN
COMMUNITY
Start: 2024-03-27 | End: 2024-04-11

## 2024-04-11 RX ORDER — EPINEPHRINE 0.3 MG/.3ML
INJECTION SUBCUTANEOUS
COMMUNITY
Start: 2024-04-02

## 2024-04-11 NOTE — PROGRESS NOTES
Subjective:   Evangelist Emery is a 53 year old female who presents for Shoulder Pain (Right shoulder) and Tmj Disorder     Patient here for eval of most pronounced right shoulder limitation of ROM with some pain.  Had hx of frozen shoulder in the past. Has had no injuries . Had success with Pt in the past.    Also pain in the right TMJ. Needs pulmonary consult re asthma.  History/Other:    Chief Complaint Reviewed and Verified  Nursing Notes Reviewed and   Verified  Medications Reviewed  Problem List Reviewed         Tobacco:  She has never smoked tobacco.    Current Outpatient Medications   Medication Sig Dispense Refill    valACYclovir 1 G Oral Tab Take 2 tablets (2,000 mg total) by mouth 2 (two) times daily as needed.      EPINEPHrine 0.3 MG/0.3ML Injection Solution Auto-injector       escitalopram 10 MG Oral Tab Take 1 tablet (10 mg total) by mouth daily. Start with 1/2 tab for at least 1 week 30 tablet 0    predniSONE 10 MG Oral Tab       Omeprazole 40 MG Oral Capsule Delayed Release Take 1 capsule (40 mg total) by mouth daily. Before a meal (Patient not taking: Reported on 3/15/2024) 30 capsule 2    azelastine 0.1 % Nasal Solution 2 sprays by Nasal route 2 (two) times daily. 30 mL 3    alendronate 70 MG Oral Tab Take 1 tablet (70 mg total) by mouth every 7 days. 4 tablet 11    LORazepam 0.5 MG Oral Tab Take 1 tablet (0.5 mg total) by mouth every 6 (six) hours as needed for Anxiety. 30 tablet 1    clotrimazole-betamethasone 1-0.05 % External Cream Use bid to affected areas of skin 45 g 2    Risedronate Sodium 150 MG Oral Tab Take 1 tablet (150 mg total) by mouth every 30 (thirty) days. (Patient not taking: Reported on 9/18/2023) 3 tablet 3    Albuterol Sulfate  (90 Base) MCG/ACT Inhalation Aero Soln Inhale 1-2 puffs into the lungs.      Levocetirizine Dihydrochloride 5 MG Oral Tab Take 1 tablet (5 mg total) by mouth.      Montelukast Sodium 10 MG Oral Tab       XOLAIR 150 MG Subcutaneous Recon Soln        Oxymetazoline HCl (NASAL SPRAY) 0.05 % Nasal Solution 1 spray by Nasal route.      Pimecrolimus (ELIDEL) 1 % External Cream Apply 1 Application topically 2 (two) times daily. Use bid (Patient not taking: Reported on 10/2/2020) 60 g 6    fluconazole 100 MG Oral Tab Take 1 tablet (100 mg total) by mouth daily. 1 po qd (Patient not taking: Reported on 11/6/2020) 10 tablet 3    Tiotropium Bromide Monohydrate 18 MCG Inhalation Cap Inhale into the lungs.      Budesonide-Formoterol Fumarate 160-4.5 MCG/ACT Inhalation Aerosol 2 puff a day      Spacer/Aero-Holding Chambers (VALVED HOLDING CHAMBER) Does not apply Device U PRN  0    Cevimeline HCl 30 MG Oral Cap Take 1 capsule (30 mg total) by mouth 3 (three) times daily. (Patient not taking: Reported on 10/2/2020) 90 capsule 3         Review of Systems:  Review of Systems   Respiratory:  Negative for cough and wheezing.    Musculoskeletal:  Positive for arthralgias (shoulders).        Right TMJ         Objective:   /60   Pulse 71   Ht 5' 3\" (1.6 m)   Wt 115 lb (52.2 kg)   SpO2 97%   BMI 20.37 kg/m²  Estimated body mass index is 20.37 kg/m² as calculated from the following:    Height as of this encounter: 5' 3\" (1.6 m).    Weight as of this encounter: 115 lb (52.2 kg).  Physical Exam  Constitutional:       Appearance: She is normal weight.   HENT:      Head: Normocephalic and atraumatic.   Eyes:      General: No scleral icterus.     Pupils: Pupils are equal, round, and reactive to light.   Cardiovascular:      Rate and Rhythm: Normal rate and regular rhythm.   Musculoskeletal:      Right lower leg: No edema.      Left lower leg: No edema.   Skin:     Findings: No lesion.   Neurological:      Mental Status: She is alert. Mental status is at baseline.   Psychiatric:         Thought Content: Thought content normal.           Assessment & Plan:   1. Chronic right shoulder pain (Primary) check xray  2. Chronic left shoulder pain check xray  Other orders  -     XR  SHOULDER, COMPLETE (MIN 2 VIEWS), RIGHT (CPT=73030); Future; Expected date: 04/11/2024    3. ASthma- pulmonary consult  4. Osteoporosis - endocrinology consult    No follow-ups on file.    Sharmila Cruz MD, 4/11/2024, 2:32 PM

## 2024-05-07 DIAGNOSIS — R45.89 ANXIETY ABOUT HEALTH: ICD-10-CM

## 2024-05-07 RX ORDER — ESCITALOPRAM OXALATE 10 MG/1
10 TABLET ORAL DAILY
Qty: 30 TABLET | Refills: 0 | Status: SHIPPED | OUTPATIENT
Start: 2024-05-07

## 2024-05-07 NOTE — TELEPHONE ENCOUNTER
Received fax from pharmacy requesting a refill on this medication: ESCITALOPRAM 10 MG TABLETS     Future Appt. NO FUTURE APPT.     Last Visit: 04/11/2024 w/ Prusek     Medication Requested:  Requested Prescriptions     Pending Prescriptions Disp Refills    escitalopram 10 MG Oral Tab 30 tablet 0     Sig: Take 1 tablet (10 mg total) by mouth daily. Start with 1/2 tab for at least 1 week        Last refill:      Disp Refills Start End     escitalopram 10 MG Oral Tab 30 tablet 0 3/21/2024 --    Sig - Route: Take 1 tablet (10 mg total) by mouth daily. Start with 1/2 tab for at least 1 week - Oral

## 2024-05-18 ENCOUNTER — LAB ENCOUNTER (OUTPATIENT)
Dept: LAB | Facility: HOSPITAL | Age: 54
End: 2024-05-18
Attending: INTERNAL MEDICINE

## 2024-05-18 ENCOUNTER — HOSPITAL ENCOUNTER (OUTPATIENT)
Dept: GENERAL RADIOLOGY | Facility: HOSPITAL | Age: 54
Discharge: HOME | End: 2024-05-18
Attending: INTERNAL MEDICINE

## 2024-05-18 DIAGNOSIS — L30.9 ECZEMA, UNSPECIFIED TYPE: ICD-10-CM

## 2024-05-18 DIAGNOSIS — Z00.00 WELLNESS EXAMINATION: ICD-10-CM

## 2024-05-18 DIAGNOSIS — G89.29 CHRONIC LEFT SHOULDER PAIN: ICD-10-CM

## 2024-05-18 DIAGNOSIS — M25.512 CHRONIC LEFT SHOULDER PAIN: ICD-10-CM

## 2024-05-18 LAB
ALBUMIN SERPL-MCNC: 4.8 G/DL (ref 3.2–4.8)
ALBUMIN/GLOB SERPL: 1.7 {RATIO} (ref 1–2)
ALP LIVER SERPL-CCNC: 75 U/L
ALT SERPL-CCNC: 11 U/L
ANION GAP SERPL CALC-SCNC: 5 MMOL/L (ref 0–18)
AST SERPL-CCNC: 18 U/L (ref ?–34)
BASOPHILS # BLD AUTO: 0.05 X10(3) UL (ref 0–0.2)
BASOPHILS NFR BLD AUTO: 1 %
BILIRUB SERPL-MCNC: 0.8 MG/DL (ref 0.3–1.2)
BUN BLD-MCNC: 13 MG/DL (ref 9–23)
BUN/CREAT SERPL: 16.5 (ref 10–20)
CALCIUM BLD-MCNC: 10.2 MG/DL (ref 8.7–10.4)
CHLORIDE SERPL-SCNC: 105 MMOL/L (ref 98–112)
CHOLEST SERPL-MCNC: 291 MG/DL (ref ?–200)
CO2 SERPL-SCNC: 30 MMOL/L (ref 21–32)
CREAT BLD-MCNC: 0.79 MG/DL
DEPRECATED RDW RBC AUTO: 38.5 FL (ref 35.1–46.3)
EGFRCR SERPLBLD CKD-EPI 2021: 91 ML/MIN/1.73M2 (ref 60–?)
EOSINOPHIL # BLD AUTO: 0.13 X10(3) UL (ref 0–0.7)
EOSINOPHIL NFR BLD AUTO: 2.5 %
ERYTHROCYTE [DISTWIDTH] IN BLOOD BY AUTOMATED COUNT: 12 % (ref 11–15)
FASTING PATIENT LIPID ANSWER: YES
FASTING STATUS PATIENT QL REPORTED: YES
GLOBULIN PLAS-MCNC: 2.9 G/DL (ref 2–3.5)
GLUCOSE BLD-MCNC: 82 MG/DL (ref 70–99)
HCT VFR BLD AUTO: 46.1 %
HDLC SERPL-MCNC: 123 MG/DL (ref 40–59)
HGB BLD-MCNC: 15.3 G/DL
IMM GRANULOCYTES # BLD AUTO: 0.01 X10(3) UL (ref 0–1)
IMM GRANULOCYTES NFR BLD: 0.2 %
LDLC SERPL CALC-MCNC: 153 MG/DL (ref ?–100)
LYMPHOCYTES # BLD AUTO: 2.11 X10(3) UL (ref 1–4)
LYMPHOCYTES NFR BLD AUTO: 41.2 %
MCH RBC QN AUTO: 29.1 PG (ref 26–34)
MCHC RBC AUTO-ENTMCNC: 33.2 G/DL (ref 31–37)
MCV RBC AUTO: 87.6 FL
MONOCYTES # BLD AUTO: 0.35 X10(3) UL (ref 0.1–1)
MONOCYTES NFR BLD AUTO: 6.8 %
NEUTROPHILS # BLD AUTO: 2.47 X10 (3) UL (ref 1.5–7.7)
NEUTROPHILS # BLD AUTO: 2.47 X10(3) UL (ref 1.5–7.7)
NEUTROPHILS NFR BLD AUTO: 48.3 %
NONHDLC SERPL-MCNC: 168 MG/DL (ref ?–130)
OSMOLALITY SERPL CALC.SUM OF ELEC: 289 MOSM/KG (ref 275–295)
PLATELET # BLD AUTO: 203 10(3)UL (ref 150–450)
POTASSIUM SERPL-SCNC: 4.2 MMOL/L (ref 3.5–5.1)
PROT SERPL-MCNC: 7.7 G/DL (ref 5.7–8.2)
RBC # BLD AUTO: 5.26 X10(6)UL
SODIUM SERPL-SCNC: 140 MMOL/L (ref 136–145)
T4 FREE SERPL-MCNC: 1.3 NG/DL (ref 0.8–1.7)
TRIGL SERPL-MCNC: 93 MG/DL (ref 30–149)
TSI SER-ACNC: 1.28 MIU/ML (ref 0.55–4.78)
VIT D+METAB SERPL-MCNC: 65.8 NG/ML (ref 30–100)
VLDLC SERPL CALC-MCNC: 18 MG/DL (ref 0–30)
WBC # BLD AUTO: 5.1 X10(3) UL (ref 4–11)

## 2024-05-18 PROCEDURE — 80050 GENERAL HEALTH PANEL: CPT | Performed by: INTERNAL MEDICINE

## 2024-05-18 PROCEDURE — 73030 X-RAY EXAM OF SHOULDER: CPT | Performed by: INTERNAL MEDICINE

## 2024-05-18 PROCEDURE — 80061 LIPID PANEL: CPT | Performed by: INTERNAL MEDICINE

## 2024-05-18 PROCEDURE — 84439 ASSAY OF FREE THYROXINE: CPT | Performed by: INTERNAL MEDICINE

## 2024-05-18 PROCEDURE — 82306 VITAMIN D 25 HYDROXY: CPT

## 2024-06-01 ENCOUNTER — OFFICE VISIT (OUTPATIENT)
Dept: DERMATOLOGY CLINIC | Facility: CLINIC | Age: 54
End: 2024-06-01

## 2024-06-01 DIAGNOSIS — L81.9 HYPERPIGMENTATION: ICD-10-CM

## 2024-06-01 DIAGNOSIS — L20.84 INTRINSIC ATOPIC DERMATITIS: ICD-10-CM

## 2024-06-01 DIAGNOSIS — D22.9 MULTIPLE NEVI: ICD-10-CM

## 2024-06-01 DIAGNOSIS — L81.4 LENTIGO: ICD-10-CM

## 2024-06-01 DIAGNOSIS — D23.9 BENIGN NEOPLASM OF SKIN, UNSPECIFIED LOCATION: ICD-10-CM

## 2024-06-01 DIAGNOSIS — L20.84 INTRINSIC ECZEMA: Primary | ICD-10-CM

## 2024-06-01 PROCEDURE — 99214 OFFICE O/P EST MOD 30 MIN: CPT | Performed by: DERMATOLOGY

## 2024-06-01 RX ORDER — PIMECROLIMUS 10 MG/G
1 CREAM TOPICAL 2 TIMES DAILY
Qty: 60 G | Refills: 6 | Status: SHIPPED | OUTPATIENT
Start: 2024-06-01

## 2024-06-01 RX ORDER — HYDROQUINONE 40 MG/G
1 CREAM TOPICAL 2 TIMES DAILY
Qty: 30 G | Refills: 1 | Status: SHIPPED | OUTPATIENT
Start: 2024-06-01 | End: 2024-07-01

## 2024-06-02 NOTE — PROGRESS NOTES
Evangelist Emery is a 53 year old female.  HPI:     CC:    Chief Complaint   Patient presents with    Full Skin Exam     New pt\" LOV 11/21. Pt present for full body exam. Pt present with some hyperpigmentation from previous ezxcema. Pt states she has a eczema flare on L foot x 3-4 days, was itchy and has now calmed down; used opzelura cream. Pt denies any hx of skin CA, Grandmother has hx of SCC.     Rash     Pt states she had a rash x 4 months. Pt was extremely itchy and red blister like lesions with the rash appeared. Pt went to ER and was given prednisone; pt did not take it and the hives calmed down on it's own.           HISTORY:    Past Medical History:    Asthma (HCC)    Osteoporosis    Scoliosis      Past Surgical History:   Procedure Laterality Date    Needle biopsy left        Family History   Problem Relation Age of Onset    Diabetes Father     Cancer Paternal Grandfather         throat    Breast Cancer Other         2 mat grt aunts      Social History     Socioeconomic History    Marital status: Single   Tobacco Use    Smoking status: Never    Smokeless tobacco: Never   Vaping Use    Vaping status: Never Used   Substance and Sexual Activity    Alcohol use: Yes    Drug use: Never   Other Topics Concern    Breast feeding No    Reaction to local anesthetic No    Pt has a pacemaker No    Pt has a defibrillator No        Current Outpatient Medications   Medication Sig Dispense Refill    Hydroquinone 4 % External Cream Apply 1 Application topically 2 (two) times daily for 60 doses. Use bid as directed 30 g 1    pimecrolimus (ELIDEL) 1 % External Cream Apply 1 Application  topically 2 (two) times daily. Use bid to eczema 60 g 6    escitalopram 10 MG Oral Tab Take 1 tablet (10 mg total) by mouth daily. Start with 1/2 tab for at least 1 week 30 tablet 0    EPINEPHrine 0.3 MG/0.3ML Injection Solution Auto-injector       valACYclovir 1 G Oral Tab Take 2 tablets (2,000 mg total) by mouth 2 (two) times daily as  needed. 21 tablet 5    Meloxicam 15 MG Oral Tab Take 1 tablet (15 mg total) by mouth daily. 30 tablet 1    predniSONE 10 MG Oral Tab       azelastine 0.1 % Nasal Solution 2 sprays by Nasal route 2 (two) times daily. 30 mL 3    alendronate 70 MG Oral Tab Take 1 tablet (70 mg total) by mouth every 7 days. 4 tablet 11    LORazepam 0.5 MG Oral Tab Take 1 tablet (0.5 mg total) by mouth every 6 (six) hours as needed for Anxiety. 30 tablet 1    clotrimazole-betamethasone 1-0.05 % External Cream Use bid to affected areas of skin 45 g 2    Albuterol Sulfate  (90 Base) MCG/ACT Inhalation Aero Soln Inhale 1-2 puffs into the lungs.      Levocetirizine Dihydrochloride 5 MG Oral Tab Take 1 tablet (5 mg total) by mouth.      Montelukast Sodium 10 MG Oral Tab       XOLAIR 150 MG Subcutaneous Recon Soln       Oxymetazoline HCl (NASAL SPRAY) 0.05 % Nasal Solution 1 spray by Nasal route.      Tiotropium Bromide Monohydrate 18 MCG Inhalation Cap Inhale into the lungs.      Budesonide-Formoterol Fumarate 160-4.5 MCG/ACT Inhalation Aerosol 2 puff a day      Spacer/Aero-Holding Chambers (VALVED HOLDING CHAMBER) Does not apply Device U PRN  0    Omeprazole 40 MG Oral Capsule Delayed Release Take 1 capsule (40 mg total) by mouth daily. Before a meal (Patient not taking: Reported on 3/15/2024) 30 capsule 2    Risedronate Sodium 150 MG Oral Tab Take 1 tablet (150 mg total) by mouth every 30 (thirty) days. (Patient not taking: Reported on 9/18/2023) 3 tablet 3    fluconazole 100 MG Oral Tab Take 1 tablet (100 mg total) by mouth daily. 1 po qd (Patient not taking: Reported on 11/6/2020) 10 tablet 3    Cevimeline HCl 30 MG Oral Cap Take 1 capsule (30 mg total) by mouth 3 (three) times daily. (Patient not taking: Reported on 10/2/2020) 90 capsule 3     Allergies:   Allergies   Allergen Reactions    Latex RASH       Past Medical History:    Asthma (HCC)    Osteoporosis    Scoliosis     Past Surgical History:   Procedure Laterality Date     Needle biopsy left       Social History     Socioeconomic History    Marital status: Single     Spouse name: Not on file    Number of children: Not on file    Years of education: Not on file    Highest education level: Not on file   Occupational History    Not on file   Tobacco Use    Smoking status: Never    Smokeless tobacco: Never   Vaping Use    Vaping status: Never Used   Substance and Sexual Activity    Alcohol use: Yes    Drug use: Never    Sexual activity: Not on file   Other Topics Concern    Grew up on a farm Not Asked    History of tanning Not Asked    Outdoor occupation Not Asked    Breast feeding No    Reaction to local anesthetic No    Pt has a pacemaker No    Pt has a defibrillator No   Social History Narrative    Not on file     Social Determinants of Health     Financial Resource Strain: Not on file   Food Insecurity: Not on file   Transportation Needs: Not on file   Physical Activity: Not on file   Stress: Not on file   Social Connections: Not on file   Housing Stability: Not on file     Family History   Problem Relation Age of Onset    Diabetes Father     Cancer Paternal Grandfather         throat    Breast Cancer Other         2 mat grt aunts       There were no vitals filed for this visit.    HPI:  Chief Complaint   Patient presents with    Full Skin Exam     New pt\" LOV 11/21. Pt present for full body exam. Pt present with some hyperpigmentation from previous ezxcema. Pt states she has a eczema flare on L foot x 3-4 days, was itchy and has now calmed down; used opzelura cream. Pt denies any hx of skin CA, Grandmother has hx of SCC.     Rash     Pt states she had a rash x 4 months. Pt was extremely itchy and red blister like lesions with the rash appeared. Pt went to ER and was given prednisone; pt did not take it and the hives calmed down on it's own.       Follow-up skin check patient with family history of skin cancer in grandmother SCC.  History of atopic disorders including asthma, eczema.   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  Has been able to work from home under less stress rash seems to have resolved.  Patient has photos on her phone which she shows showing erythematous indurated nummular plaques over the arms, trunk, resolved slowly on its own.  Has had eczematous patches lesion flaring on foot has Opzelura at home had old prescription from Dr. Munson.  Does feel that pimecrolimus had worked extremely well to manage her eczema when this flares.  Currently on Xolair for her asthma along with inhalers.  Recent health complicated by osteoporosis, asthma exacerbations intermittently.  Still notes hyperpigmented patches axillary proximal thighs from previous pityriasis rosea flare.    No other concerns    Patient presents with concerns above.    Patient has been in their usual state of health.     Past notes/ records and appropriate/relevant lab results including pathology and past body maps reviewed. Including outside notes/ PCP notes as appropriate. Updated and new information noted in current visit.     ROS:  Denies other relevant systemic complaints.      History, medications, allergies reviewed as noted.    Allergies:  Latex     Physical Examination:     Well-developed well-nourished patient alert oriented in no acute distress.  Exam performed, including scalp, head, neck, face,nails, hair, external eyes, including conjunctival mucosa, eyelids, lips external ears , arms, digits,palms.     Multiple light to medium brown, well marginated, uniformly pigmented, macules and papules 6 mm and less scattered on exam. pigmented lesions examined with dermoscopy benign-appearing patterns.     Waxy tannish keratotic papules scattered, cherry-red vascular papules scattered.    See map today's date for lesions noted .  See assessment and plan below  for specific lesions.    Otherwise remarkable for lesions as noted on map.    See A/P  below for additional information:    Assessment / plan:    No orders of the defined types were placed in this encounter.      Meds & Refills for this Visit:  Requested Prescriptions     Signed Prescriptions Disp Refills    Hydroquinone 4 % External Cream 30 g 1     Sig: Apply 1 Application topically 2 (two) times daily for 60 doses. Use bid as directed    pimecrolimus (ELIDEL) 1 % External Cream 60 g 6     Sig: Apply 1 Application  topically 2 (two) times daily. Use bid to eczema         Encounter Diagnoses   Name Primary?    Intrinsic eczema Yes    Intrinsic atopic dermatitis     Multiple nevi     Lentigo     Benign neoplasm of skin, unspecified location     Hyperpigmentation        Family history of SCC and grandmother no suspicious lesions.  Continue careful sun protection monitoring    Patient does use retinol on face tolerates this without any side effects.    Hyperpigmentation postinflammatory from pityriasis rosea hydroquinone.  Use cautiously possible irritation, staining of fabrics.  Will let us know if she needs alternate pharmacy.      Atopic dermatitis  Has Opzelura this has worked fairly well.  Pimecrolimus had seem to calm itching very quickly also  Will send both.  Has used them at different times.  Occasional flareups from environmental exposure  Areas currently nummular patch resolving on left foot    Discussed anxiety may have led to what appears to be dermal hypersensitivity reaction larger plaques of eczema gastrostomy trigger this.  Did not take oral steroids at that time did seem to improve slowly with improvement in stress.  Continue current management of her asthma  Gentle skin care    Benign-appearing nevi, no atypical features on dermoscopy reassurance given monitor.     Waxy tan keratotic papules lesions in areas of concern as noted reassurance given.  Benign nature discussed.  Possibility of cryo,  alphahydroxy acids over-the-counter retinol's discussed.     No other susupicious lesions on todays  exam.    Please refer to map for specific lesions.  See additional diagnoses.  Pros cons of various therapies, risks benefits discussed.Pathophysiology discussed with patient.  Therapeutic options reviewed.  See  Medications in grid.  Instructions reviewed at length.    Benign nevi, seborrheic  keratoses, cherry angiomas:  Reassurance regarding other benign skin lesions.Signs and symptoms of skin cancer, ABCDE's of melanoma discussed with patient. Sunscreen (broad-spectrum, ideally mineral-based-UVA/UVB -SPF 30 or higher) use encouraged, sun protection/sun protective clothing, self exams reviewed Followup as noted RTC ---routine checkup    6 mos -one year or p.r.n.    Encounter Times   Including precharting, reviewing chart, prior notes obtaining history: 10 minutes, medical exam :10 minutes, notes on body map, plan, counseling 10minutes My total time spent caring for the patient on the day of the encounter: 30 minutes     The patient indicates understanding of these issues and agrees to the plan.  The patient is asked to return as noted in follow-up/ above.    This note was generated using Dragon voice recognition software.  Please contact me regarding any confusion resulting from errors in recognition..  Note to patient and family: The 21st Century Cures Act makes medical notes like these available to patients. However, be advised this is a medical document. It is intended as [id number] communication and monitoring of a patient's care needs. It is written in medical language and may contain abbreviations or verbiage that are unfamiliar. It may appear blunt or direct. Medical documents are intended to carry relevant information, facts as evident and the clinical opinion of the practitioner.

## 2024-06-14 ENCOUNTER — OFFICE VISIT (OUTPATIENT)
Dept: OTOLARYNGOLOGY | Facility: CLINIC | Age: 54
End: 2024-06-14

## 2024-06-14 DIAGNOSIS — R09.82 POSTNASAL DISCHARGE: Primary | ICD-10-CM

## 2024-06-14 DIAGNOSIS — R07.0 THROAT DISCOMFORT: ICD-10-CM

## 2024-06-14 PROCEDURE — 99214 OFFICE O/P EST MOD 30 MIN: CPT | Performed by: OTOLARYNGOLOGY

## 2024-06-14 NOTE — PROGRESS NOTES
Evangelist Emery is a 53 year old female.    Chief Complaint   Patient presents with    Follow - Up     Patient is here due to postnasal drip and throat discomfort follow up.        HISTORY OF PRESENT ILLNESS  She presents with a recent history of sore throat which started about 2 weeks ago.  Diagnosed with acute thrush and treated with Diflucan for 12 days.  She has taken 4 days worth of antifungals.  She feels that her symptoms have resolved.      4/26/18 she is doing quite well up until recently when she was treated with steroids once again.  Steroids led to recurrence of her thrush.  Some improvement after being on Diflucan as well as nystatin.  Incompletely resolved and still has some tongue lesions.     6/11/19 2 weeks ago she developed an upper respiratory tract infection went to immediate care about 10 days ago was told that she had an infection but that it did not require any antibiotics and was given some prednisone which she did not take.  Essentially she is just been using Singulair and levocetirizine but no other medications and feels that her infectious process has resolved but that she still continues to be very congested and has fullness in both ears.  She can now palpate years but does not hear any bubbling.     2/15/24 she presents with 2-month history of feeling for something the back of her throat and some difficulty swallowing.  Feels that there is always tonsil congestion.  She does state today that she has been using Afrin 1 puff each nare at least once a day consistently for about 15 years.  She does note a lot of postnasal discharge at times.  No voice changes some throat clearing.  No other significant signs, symptoms or complaints.  Known history of allergies currently on Xyzal Flonase and montelukast.  Admits that she does not use her medications consistently.  Has a hard time tolerating high-dose Sudafed.     3/15/24 since I last saw her she has done quite well.  No longer using Afrin  at all.  Last visit I did ask her to hold off and use of her Symbicort is much as possible and to continue with Xyzal and montelukast.  She is on low-dose Sudafed longer using any nasal sprays resolve her reflux.  Overall feels that her throat has improved dramatically and even her nose is better suspected congestion.  Occasional have some congestive issues in the middle of the day but that is tolerable and she can get by.  No other signs, symptoms or complaints.  Currently working at home has been asked to potentially return to work in a workplace but she gets sick very frequently.  She does have a long history of asthma.      6/14/24 since I last saw her she has not used any Afrin.  Minimal Sudafed only using Allegra and Singulair.  She has had to increase her use of Symbicort.  She does have a history of immunotherapy years ago.  She is not sure if it really helped much.  Overall doing quite well at this time working from home avoids humidity.  No other signs, symptoms or complaint      Social History     Socioeconomic History    Marital status: Single   Tobacco Use    Smoking status: Never    Smokeless tobacco: Never   Vaping Use    Vaping status: Never Used   Substance and Sexual Activity    Alcohol use: Yes    Drug use: Never   Other Topics Concern    Breast feeding No    Reaction to local anesthetic No    Pt has a pacemaker No    Pt has a defibrillator No       Family History   Problem Relation Age of Onset    Diabetes Father     Cancer Paternal Grandfather         throat    Breast Cancer Other         2 mat grt aunts       Past Medical History:    Asthma (HCC)    Osteoporosis    Scoliosis       Past Surgical History:   Procedure Laterality Date    Needle biopsy left           REVIEW OF SYSTEMS    System Neg/Pos Details   Constitutional Negative Fatigue, fever and weight loss.   ENMT Negative Drooling.   Eyes Negative Blurred vision and vision changes.   Respiratory Negative Dyspnea and wheezing.   Cardio  Negative Chest pain, irregular heartbeat/palpitations and syncope.   GI Negative Abdominal pain and diarrhea.   Endocrine Negative Cold intolerance and heat intolerance.   Neuro Negative Tremors.   Psych Negative Anxiety and depression.   Integumentary Negative Frequent skin infections, pigment change and rash.   Hema/Lymph Negative Easy bleeding and easy bruising.           PHYSICAL EXAM    There were no vitals taken for this visit.       Constitutional Normal Overall appearance - Normal.   Psychiatric Normal Orientation - Oriented to time, place, person & situation. Appropriate mood and affect.   Neck Exam Normal Inspection - Normal. Palpation - Normal. Parotid gland - Normal. Thyroid gland - Normal.   Eyes Normal Conjunctiva - Right: Normal, Left: Normal. Pupil - Right: Normal, Left: Normal. Fundus - Right: Normal, Left: Normal.   Neurological Normal Memory - Normal. Cranial nerves - Cranial nerves II through XII grossly intact.   Head/Face Normal Facial features - Normal. Eyebrows - Normal. Skull - Normal.        Nasopharynx Normal External nose - Normal. Lips/teeth/gums - Normal. Tonsils - Normal. Oropharynx - Normal.   Ears Normal Inspection - Right: Normal, Left: Normal. Canal - Right: Normal, Left: Normal. TM - Right: Normal, Left: Normal.   Skin Normal Inspection - Normal.        Lymph Detail Normal Submental. Submandibular. Anterior cervical. Posterior cervical. Supraclavicular.        Nose/Mouth/Throat Normal External nose - Normal. Lips/teeth/gums - Normal. Tonsils - Normal. Oropharynx - Normal.   Nose/Mouth/Throat Normal Nares - Right: Normal Left: Normal. Septum deviated to the left turbinates - Right: Normal, Left: Normal.       Current Outpatient Medications:     Hydroquinone 4 % External Cream, Apply 1 Application topically 2 (two) times daily for 60 doses. Use bid as directed, Disp: 30 g, Rfl: 1    pimecrolimus (ELIDEL) 1 % External Cream, Apply 1 Application  topically 2 (two) times daily. Use bid  to eczema, Disp: 60 g, Rfl: 6    escitalopram 10 MG Oral Tab, Take 1 tablet (10 mg total) by mouth daily. Start with 1/2 tab for at least 1 week, Disp: 30 tablet, Rfl: 0    EPINEPHrine 0.3 MG/0.3ML Injection Solution Auto-injector, , Disp: , Rfl:     valACYclovir 1 G Oral Tab, Take 2 tablets (2,000 mg total) by mouth 2 (two) times daily as needed., Disp: 21 tablet, Rfl: 5    Meloxicam 15 MG Oral Tab, Take 1 tablet (15 mg total) by mouth daily., Disp: 30 tablet, Rfl: 1    predniSONE 10 MG Oral Tab, , Disp: , Rfl:     Omeprazole 40 MG Oral Capsule Delayed Release, Take 1 capsule (40 mg total) by mouth daily. Before a meal, Disp: 30 capsule, Rfl: 2    azelastine 0.1 % Nasal Solution, 2 sprays by Nasal route 2 (two) times daily., Disp: 30 mL, Rfl: 3    alendronate 70 MG Oral Tab, Take 1 tablet (70 mg total) by mouth every 7 days., Disp: 4 tablet, Rfl: 11    LORazepam 0.5 MG Oral Tab, Take 1 tablet (0.5 mg total) by mouth every 6 (six) hours as needed for Anxiety., Disp: 30 tablet, Rfl: 1    clotrimazole-betamethasone 1-0.05 % External Cream, Use bid to affected areas of skin, Disp: 45 g, Rfl: 2    Risedronate Sodium 150 MG Oral Tab, Take 1 tablet (150 mg total) by mouth every 30 (thirty) days., Disp: 3 tablet, Rfl: 3    Albuterol Sulfate  (90 Base) MCG/ACT Inhalation Aero Soln, Inhale 1-2 puffs into the lungs., Disp: , Rfl:     Levocetirizine Dihydrochloride 5 MG Oral Tab, Take 1 tablet (5 mg total) by mouth., Disp: , Rfl:     Montelukast Sodium 10 MG Oral Tab, , Disp: , Rfl:     XOLAIR 150 MG Subcutaneous Recon Soln, , Disp: , Rfl:     Oxymetazoline HCl (NASAL SPRAY) 0.05 % Nasal Solution, 1 spray by Nasal route., Disp: , Rfl:     fluconazole 100 MG Oral Tab, Take 1 tablet (100 mg total) by mouth daily. 1 po qd, Disp: 10 tablet, Rfl: 3    Tiotropium Bromide Monohydrate 18 MCG Inhalation Cap, Inhale into the lungs., Disp: , Rfl:     Budesonide-Formoterol Fumarate 160-4.5 MCG/ACT Inhalation Aerosol, 2 puff a day,  Disp: , Rfl:     Spacer/Aero-Holding Chambers (VALVED HOLDING CHAMBER) Does not apply Device, U PRN, Disp: , Rfl: 0    Cevimeline HCl 30 MG Oral Cap, Take 1 capsule (30 mg total) by mouth 3 (three) times daily., Disp: 90 capsule, Rfl: 3  ASSESSMENT AND PLAN    1. Postnasal discharge    2. Throat discomfort  Physical exam demonstrates a deviated septum to the left.  Actually doing quite well on Singulair and Allegra.  We discussed the fact that her Symbicort can cause her some issues with fungal infection and thrush of the laryngeal area.  She will contact my office if she requires any antifungals.  We did discuss the fact that when she uses more allergy medication she uses less Symbicort.  Continue without using Afrin and use Sudafed as needed.  Greater than 30 minutes spent with patient in discussions regarding all of her issues and management options.        This note was prepared using Dragon Medical voice recognition dictation software. As a result errors may occur. When identified these errors have been corrected. While every attempt is made to correct errors during dictation discrepancies may still exist    Kavin Cloud MD    6/14/2024    7:37 AM

## 2024-12-09 ENCOUNTER — TELEPHONE (OUTPATIENT)
Dept: INTERNAL MEDICINE CLINIC | Facility: CLINIC | Age: 54
End: 2024-12-09

## 2024-12-09 NOTE — TELEPHONE ENCOUNTER
Patient calling and requesting a letter on letter head to be excused from jury duty due her medical conditions. Needs letter by this week and would need it faxed to [phone]. Please include juror number-[phone]    Patient would like a call back to let her know when the letter has been faxed or if Dr. Cruz will not write the letter.

## 2025-02-10 ENCOUNTER — NURSE TRIAGE (OUTPATIENT)
Dept: INTERNAL MEDICINE CLINIC | Facility: CLINIC | Age: 55
End: 2025-02-10

## 2025-02-10 ENCOUNTER — TELEPHONE (OUTPATIENT)
Dept: INTERNAL MEDICINE CLINIC | Facility: CLINIC | Age: 55
End: 2025-02-10

## 2025-02-10 NOTE — TELEPHONE ENCOUNTER
Patient called the office.  She is asking to please have a nurse call her back..    In regards to post Covid symptoms she is experiencing.

## 2025-02-10 NOTE — TELEPHONE ENCOUNTER
Spoke with Vida riaz lopez last week but she is recovering. Evangelist has new symptoms  starting on Saturday 2/1/25  and started paxlovid Monday 2/3/25 and ended Saturday 2/8/25. She reports foamy urine and mild intermittent low back pain.Patient received prescribed for paxlovid from outside [facility]. Patient denied painful urination, fever, frequency, or blood in her urine.      Disposition: Consult with Dr. Cruz.     Please advise if Evangelist would need to be seen in [facility] or ?         Answer Assessment - Initial Assessment Questions  1. ONSET: \"When did the pain begin?\"       2/18/25  2. LOCATION: \"Where does it hurt?\" (upper, mid or lower back)     Intermittent Low back  3. SEVERITY: \"How bad is the pain?\"  (e.g., Scale 1-10; mild, moderate, or severe)    - MILD (1-3): Doesn't interfere with normal activities.     - MODERATE (4-7): Interferes with normal activities or awakens from sleep.     - SEVERE (8-10): Excruciating pain, unable to do any normal activities.       2-3/10  4. PATTERN: \"Is the pain constant?\" (e.g., yes, no; constant, intermittent)       Intermittent  5. RADIATION: \"Does the pain shoot into your legs or somewhere else?\"      Denied  6. CAUSE:  \"What do you think is causing the back pain?\"       Unknown   7. BACK OVERUSE:  \"Any recent lifting of heavy objects, strenuous work or exercise?\"      Denied  8. MEDICINES: \"What have you taken so far for the pain?\" (e.g., nothing, acetaminophen, NSAIDS)      Denied  9. NEUROLOGIC SYMPTOMS: \"Do you have any weakness, numbness, or problems with bowel/bladder control?\"      Denied  10. OTHER SYMPTOMS: \"Do you have any other symptoms?\" (e.g., fever, abdomen pain, burning with urination, blood in urine)        Denied  11. PREGNANCY: \"Is there any chance you are pregnant?\" \"When was your last menstrual period?\"        N/A    Protocols used: Back Pain-A-[place]

## 2025-02-10 NOTE — TELEPHONE ENCOUNTER
Spoke with Evangelist informed her per Dr. Cruz if she continues to have no urinary symptoms to increase fluids and observe. If she develops pain with voiding, blood in urine, or frequency to go to the [facility]. Dr. Cruz states it is unlikely this is from Paxlovid. Patient voiced understanding.

## 2025-02-20 ENCOUNTER — OFFICE VISIT (OUTPATIENT)
Dept: INTERNAL MEDICINE CLINIC | Facility: CLINIC | Age: 55
End: 2025-02-20
Payer: COMMERCIAL

## 2025-02-20 VITALS
WEIGHT: 119 LBS | HEART RATE: 75 BPM | SYSTOLIC BLOOD PRESSURE: 110 MMHG | HEIGHT: 63 IN | OXYGEN SATURATION: 98 % | DIASTOLIC BLOOD PRESSURE: 60 MMHG | BODY MASS INDEX: 21.09 KG/M2

## 2025-02-20 DIAGNOSIS — M81.8 OSTEOPOROSIS, IDIOPATHIC: ICD-10-CM

## 2025-02-20 DIAGNOSIS — Z00.00 WELLNESS EXAMINATION: Primary | ICD-10-CM

## 2025-02-20 DIAGNOSIS — J01.00 SUBACUTE MAXILLARY SINUSITIS: ICD-10-CM

## 2025-02-20 DIAGNOSIS — J45.20 MILD INTERMITTENT ASTHMA WITHOUT COMPLICATION (HCC): ICD-10-CM

## 2025-02-20 DIAGNOSIS — Z12.31 SCREENING MAMMOGRAM FOR BREAST CANCER: ICD-10-CM

## 2025-02-20 DIAGNOSIS — Z86.16 HISTORY OF COVID-19: ICD-10-CM

## 2025-02-20 PROCEDURE — 99396 PREV VISIT EST AGE 40-64: CPT | Performed by: INTERNAL MEDICINE

## 2025-02-20 PROCEDURE — 3008F BODY MASS INDEX DOCD: CPT | Performed by: INTERNAL MEDICINE

## 2025-02-20 PROCEDURE — 3078F DIAST BP <80 MM HG: CPT | Performed by: INTERNAL MEDICINE

## 2025-02-20 PROCEDURE — 3074F SYST BP LT 130 MM HG: CPT | Performed by: INTERNAL MEDICINE

## 2025-02-20 RX ORDER — AZITHROMYCIN 250 MG/1
TABLET, FILM COATED ORAL
Qty: 6 TABLET | Refills: 0 | Status: SHIPPED | OUTPATIENT
Start: 2025-02-20 | End: 2025-02-25

## 2025-02-20 NOTE — PROGRESS NOTES
Subjective:   Evangelist Emery is a 54 year old female who presents for Physical     Patient here for annual examination and fu on mild astma, recent Covid infection complicated by sinus headaches.  Is due for a mammogram.  History/Other:    Chief Complaint Reviewed and Verified  No Further Nursing Notes to   Review  Medications Reviewed  Problem List Reviewed         Tobacco: non smoker       Current Outpatient Medications   Medication Sig Dispense Refill    azithromycin (ZITHROMAX Z-ARIANA) 250 MG Oral Tab Take 2 tablets (500 mg total) by mouth daily for 1 day, THEN 1 tablet (250 mg total) daily for 4 days. 6 tablet 0    pimecrolimus (ELIDEL) 1 % External Cream Apply 1 Application  topically 2 (two) times daily. Use bid to eczema 60 g 6    escitalopram 10 MG Oral Tab Take 1 tablet (10 mg total) by mouth daily. Start with 1/2 tab for at least 1 week 30 tablet 0    EPINEPHrine 0.3 MG/0.3ML Injection Solution Auto-injector       valACYclovir 1 G Oral Tab Take 2 tablets (2,000 mg total) by mouth 2 (two) times daily as needed. 21 tablet 5    Meloxicam 15 MG Oral Tab Take 1 tablet (15 mg total) by mouth daily. 30 tablet 1    predniSONE 10 MG Oral Tab       Omeprazole 40 MG Oral Capsule Delayed Release Take 1 capsule (40 mg total) by mouth daily. Before a meal 30 capsule 2    azelastine 0.1 % Nasal Solution 2 sprays by Nasal route 2 (two) times daily. 30 mL 3    alendronate 70 MG Oral Tab Take 1 tablet (70 mg total) by mouth every 7 days. 4 tablet 11    LORazepam 0.5 MG Oral Tab Take 1 tablet (0.5 mg total) by mouth every 6 (six) hours as needed for Anxiety. 30 tablet 1    clotrimazole-betamethasone 1-0.05 % External Cream Use bid to affected areas of skin 45 g 2    Risedronate Sodium 150 MG Oral Tab Take 1 tablet (150 mg total) by mouth every 30 (thirty) days. 3 tablet 3    Albuterol Sulfate  (90 Base) MCG/ACT Inhalation Aero Soln Inhale 1-2 puffs into the lungs.      Levocetirizine Dihydrochloride 5 MG Oral  Tab Take 1 tablet (5 mg total) by mouth.      Montelukast Sodium 10 MG Oral Tab       XOLAIR 150 MG Subcutaneous Recon Soln       Oxymetazoline HCl (NASAL SPRAY) 0.05 % Nasal Solution 1 spray by Nasal route.      fluconazole 100 MG Oral Tab Take 1 tablet (100 mg total) by mouth daily. 1 po qd 10 tablet 3    Tiotropium Bromide Monohydrate 18 MCG Inhalation Cap Inhale into the lungs.      Budesonide-Formoterol Fumarate 160-4.5 MCG/ACT Inhalation Aerosol 2 puff a day      Spacer/Aero-Holding Chambers (VALVED HOLDING CHAMBER) Does not apply Device U PRN  0    Cevimeline HCl 30 MG Oral Cap Take 1 capsule (30 mg total) by mouth 3 (three) times daily. 90 capsule 3         Review of Systems:  Review of Systems   HENT:  Positive for sinus pressure and sinus pain.    Eyes:  Positive for visual disturbance.   Respiratory:  Negative for cough, shortness of breath and wheezing.    Gastrointestinal: Negative.    Genitourinary:  Positive for frequency.   Neurological: Negative.    Psychiatric/Behavioral:  The patient is nervous/anxious.          Objective:   /60   Pulse 75   Ht 5' 3\" (1.6 m)   Wt 119 lb (54 kg)   SpO2 98%   BMI 21.08 kg/m²  Estimated body mass index is 21.08 kg/m² as calculated from the following:    Height as of this encounter: 5' 3\" (1.6 m).    Weight as of this encounter: 119 lb (54 kg).  Physical Exam  Constitutional:       Appearance: She is normal weight.   HENT:      Head: Normocephalic and atraumatic.      Right Ear: Ear canal normal.      Left Ear: Ear canal normal.      Mouth/Throat:      Comments: Post nasal drip  Eyes:      General: No scleral icterus.     Pupils: Pupils are equal, round, and reactive to light.   Neck:      Vascular: No carotid bruit.   Cardiovascular:      Rate and Rhythm: Normal rate and regular rhythm.   Pulmonary:      Effort: Pulmonary effort is normal.      Breath sounds: Normal breath sounds.   Abdominal:      General: Abdomen is flat.      Tenderness: There is no  abdominal tenderness.   Genitourinary:     Comments: Breasts implants no nipple anomaly  Musculoskeletal:      Cervical back: Neck supple.   Lymphadenopathy:      Cervical: No cervical adenopathy.   Skin:     Findings: No lesion.   Neurological:      Mental Status: She is alert. Mental status is at baseline.   Psychiatric:         Thought Content: Thought content normal.           Assessment & Plan:   1. Wellness examination (Primary) check fasting labs  -     Comp Metabolic Panel (14); Future; Expected date: 02/20/2025  -     Lipid Panel; Future; Expected date: 02/20/2025  -     CBC With Differential With Platelet; Future; Expected date: 02/20/2025  -     TSH and Free T4; Future; Expected date: 02/20/2025  -     Urinalysis with Culture Reflex; Future; Expected date: 02/20/2025  2. History of COVID-19 no residual long term symptoms  3. Osteoporosis, idiopathic on fosamax  -     Vitamin D; Future; Expected date: 02/20/2025  4. Mild intermittent asthma without complication (HCC) on xolair and albuterol  Overview:    5. Screening mammogram - referred.    6. Sinusitis - zpack x 5 days  Last Assessment & Plan:   No follow-ups on file.    Sharmila Cruz MD, 2/20/2025, 2:41 PM

## 2025-03-26 ENCOUNTER — TELEPHONE (OUTPATIENT)
Dept: INTERNAL MEDICINE CLINIC | Facility: CLINIC | Age: 55
End: 2025-03-26

## 2025-03-26 NOTE — TELEPHONE ENCOUNTER
Patient said she had her blood work drawn at [facility] on 3/1.  She received the results with some abnormalities that she is concerned about. She noticed that the results haven't come to her My Chart account, so she believes that isn't why she heard from Dr. Cruz.    She had the labs drawn at [facility], [address] [facility].    Patient also said she'll need a work letter, but needs to please talk to Dr. Cruz on the correct wording of the letter.  In reference to a ADA Act.

## 2025-03-26 NOTE — TELEPHONE ENCOUNTER
Spoke with Evangelist informed she needs to have [facility] fax results to our office. Evangelist states she has the fax number.     Evangelist states she has concerns about elevated , . RN advises once Dr. Cruz receives her lab results she will interpret and provide her recommendations. Evangelist voiced understanding.

## 2025-03-31 ENCOUNTER — TELEPHONE (OUTPATIENT)
Dept: INTERNAL MEDICINE CLINIC | Facility: CLINIC | Age: 55
End: 2025-03-31

## 2025-03-31 NOTE — TELEPHONE ENCOUNTER
On Thursday, patient faxed a form to be signed by Dr. Cruz and re-faxed.    Does Dr. Cruz remember signing any paperwork for this patient on Friday, 03/28/2025?    Please call patient to update on whether this faxed paperwork has been received, signed and returned to sender:    [phone]     KG

## 2025-03-31 NOTE — TELEPHONE ENCOUNTER
Pt is aware form was signed and faxed on Friday 3/28.  Form will be left at  and also another copy was mailed to pt home address, per pt request.

## 2025-03-31 NOTE — TELEPHONE ENCOUNTER
Patient calling to check to see if we have an accomodation form as she needed a copy. Patient states form was completed at the providers office. Advised patient as it was completed at the provider's office I do not see it in the chart as of yet. Advised patient that it may be at the provider's office or Medical records as it may have been sent to be scanned. Patient understood and had no further questions.

## (undated) NOTE — LETTER
Date: 12/9/2024    Patient Name: Evangelist Emery-Juror#[id number]          To Whom it may concern:    This letter has been written at the patient's request. The above patient was seen at [facility] for treatment of a medical condition.    Evangelist Emery should be excused from Jury duty due to her medical conditions.     Please feel free to contact our office with any question regarding this matter.         Sincerely,      Sharmila Cruz MD